# Patient Record
Sex: MALE | Race: WHITE | NOT HISPANIC OR LATINO | Employment: UNEMPLOYED | URBAN - METROPOLITAN AREA
[De-identification: names, ages, dates, MRNs, and addresses within clinical notes are randomized per-mention and may not be internally consistent; named-entity substitution may affect disease eponyms.]

---

## 2023-06-13 LAB
HEPATITIS B VIRUS SURFACE AG PRESENCE IN SERUM: NONREACTIVE
HEPATITIS C VIRUS AB PRESENCE IN SERUM: REACTIVE
HIV 1/ 2 AG/AB SCREEN: NONREACTIVE
SYPHILIS TOTAL AB: NONREACTIVE

## 2023-06-14 LAB
CHLAMYDIA TRACH., AMPLIFIED: NEGATIVE
HCV PCR QUANT: ABNORMAL IU/ML
HCV RNA, PCR LOG: 7.32 LOG10 IU/ML
N. GONORRHEA, AMPLIFIED: NEGATIVE

## 2023-06-26 NOTE — PROGRESS NOTES
"Subjective   Chief Complaint   Patient presents with    Follow-up     CHAS IS HERE TODAY  TO DISCUSS BLOOD WORK RESULTS THAT HE HAD COMPLETED ABOUT 10 DAYS AGO, PT HAD BLOOD WORK COMPLETED AT [facility] AND WAS INFORMED HE WAS HEP C+ AND PT WOULD LIKE TO START TREATMENT FOR THAT.     PT ALSO HAS C/O INCREASED LBP X2-3 WEEKS, PT REPORTS THAT THE PAIN DOES NOT RADIATE. PT TRIED USING ICE, HEAT, STRETCHING WITH NO RELIEF. PT REPORTS THAT WITH SITTING HIS PAIN INCREASES AND LYING FLAT PROVIDES HIM WITH RELIEF.        Patient ID: Chas Castanon is a 42 y.o. male who presents for Follow-up (CHAS IS HERE TODAY  TO DISCUSS BLOOD WORK RESULTS THAT HE HAD COMPLETED ABOUT 10 DAYS AGO, PT HAD BLOOD WORK COMPLETED AT [facility] AND WAS INFORMED HE WAS HEP C+ AND PT WOULD LIKE TO START TREATMENT FOR THAT. //PT ALSO HAS C/O INCREASED LBP X2-3 WEEKS, PT REPORTS THAT THE PAIN DOES NOT RADIATE. PT TRIED USING ICE, HEAT, STRETCHING WITH NO RELIEF. PT REPORTS THAT WITH SITTING HIS PAIN INCREASES AND LYING FLAT PROVIDES HIM WITH RELIEF. ).    HPI  43 yo est male presents today to discuss recent lab results that were ordered by his fertility doctor  LOV Nov 2020    Pt states he began seeing infertility specialist on 6/10/23 and had labs done  Was found to have + Hep C antibody  Last Hep C screening was Aug 2016---> NEGATIVE   Pt denies any N/V/Anorexia/Unexplained weight loss  Pt has mult tattoos and hx of IV drug use from 6815-1477   Pt has been sober since 9/2018    Pt also c/o low back pain, nonradiating   States original onset was \"years ago\"   Mostly bothers him when sitting and he has to drive [place] [address] [address] so he was wondering if he could get some Ibuprofen for pain   Has been doing some stretching and it does make it feel better       Review of Systems  All 13 systems were reviewed and are within normal limits except positive and pertinent negative responses which are noted below or in HPI.      Objective   /70   Pulse 75   Ht 1.854 " "m (6' 1\")   Wt 95.7 kg (211 lb)   BMI 27.84 kg/m²        Physical Exam  Vitals reviewed.   HENT:      Right Ear: Tympanic membrane normal.      Left Ear: Tympanic membrane normal.   Eyes:      Conjunctiva/sclera: Conjunctivae normal.   Cardiovascular:      Pulses: Normal pulses.   Pulmonary:      Effort: Pulmonary effort is normal.   Abdominal:      General: Abdomen is flat. Bowel sounds are normal. There is no distension.      Tenderness: There is no abdominal tenderness. There is no guarding.   Skin:     General: Skin is warm and dry.      Capillary Refill: Capillary refill takes less than 2 seconds.   Neurological:      Mental Status: He is alert.   Psychiatric:         Mood and Affect: Mood normal.         Assessment/Plan   Problem List Items Addressed This Visit       Opioid dependence with withdrawal (CMS/HCC) - Primary    Hepatitis C antibody test positive    Relevant Orders    Referral to Hepatology    Chronic bilateral low back pain without sciatica    Relevant Medications    methocarbamol (Robaxin) 500 mg tablet    ibuprofen 800 mg tablet       "

## 2023-06-27 ENCOUNTER — OFFICE VISIT (OUTPATIENT)
Dept: PRIMARY CARE | Facility: CLINIC | Age: 43
End: 2023-06-27
Payer: COMMERCIAL

## 2023-06-27 VITALS
WEIGHT: 211 LBS | HEART RATE: 75 BPM | HEIGHT: 73 IN | SYSTOLIC BLOOD PRESSURE: 109 MMHG | BODY MASS INDEX: 27.96 KG/M2 | DIASTOLIC BLOOD PRESSURE: 70 MMHG

## 2023-06-27 DIAGNOSIS — G89.29 CHRONIC BILATERAL LOW BACK PAIN WITHOUT SCIATICA: ICD-10-CM

## 2023-06-27 DIAGNOSIS — M54.50 CHRONIC BILATERAL LOW BACK PAIN WITHOUT SCIATICA: ICD-10-CM

## 2023-06-27 DIAGNOSIS — R76.8 HEPATITIS C ANTIBODY TEST POSITIVE: ICD-10-CM

## 2023-06-27 DIAGNOSIS — F11.23 OPIOID DEPENDENCE WITH WITHDRAWAL (MULTI): Primary | ICD-10-CM

## 2023-06-27 PROBLEM — R74.8 ELEVATED LIVER ENZYMES: Status: ACTIVE | Noted: 2023-06-27

## 2023-06-27 PROBLEM — J30.9 ALLERGIC RHINITIS: Status: ACTIVE | Noted: 2023-06-27

## 2023-06-27 PROCEDURE — 99213 OFFICE O/P EST LOW 20 MIN: CPT | Performed by: NURSE PRACTITIONER

## 2023-06-27 PROCEDURE — 1036F TOBACCO NON-USER: CPT | Performed by: NURSE PRACTITIONER

## 2023-06-27 RX ORDER — IBUPROFEN 800 MG/1
800 TABLET ORAL 3 TIMES DAILY PRN
Qty: 180 TABLET | Refills: 0 | Status: SHIPPED | OUTPATIENT
Start: 2023-06-27 | End: 2023-11-03 | Stop reason: SDUPTHER

## 2023-06-27 RX ORDER — METHOCARBAMOL 500 MG/1
500 TABLET, FILM COATED ORAL EVERY 12 HOURS PRN
Qty: 60 TABLET | Refills: 0 | Status: SHIPPED | OUTPATIENT
Start: 2023-06-27 | End: 2024-05-09 | Stop reason: HOSPADM

## 2023-06-27 NOTE — PATIENT INSTRUCTIONS
Thank you for seeing me today.  It was a pleasure to see you again!    #HEP C +  See hepatology    #LOW BACK PAIN  Rx Robaxin   Rx Ibuprofen   Hamstring stretching  Heat   No lifting or twisting     RTC AS NEEDED

## 2023-06-28 ENCOUNTER — TELEPHONE (OUTPATIENT)
Dept: PRIMARY CARE | Facility: CLINIC | Age: 43
End: 2023-06-28
Payer: COMMERCIAL

## 2023-06-28 DIAGNOSIS — M54.50 CHRONIC BILATERAL LOW BACK PAIN WITHOUT SCIATICA: Primary | ICD-10-CM

## 2023-06-28 DIAGNOSIS — G89.29 CHRONIC BILATERAL LOW BACK PAIN WITHOUT SCIATICA: Primary | ICD-10-CM

## 2023-08-28 LAB
ESTRADIOL (PG/ML) IN SER/PLAS: 28 PG/ML
FOLLITROPIN (IU/L) IN SER/PLAS: <0.9 IU/L
LUTEINIZING HORMONE (IU/ML) IN SER/PLAS: <0.1 IU/L
PROLACTIN (UG/L) IN SER/PLAS: 7.1 UG/L (ref 2–18)
TESTOSTERONE (NG/DL) IN SER/PLAS: 992 NG/DL (ref 240–1000)

## 2023-08-29 PROBLEM — J02.9 ACUTE PHARYNGITIS: Status: ACTIVE | Noted: 2023-08-29

## 2023-08-29 PROBLEM — H02.409 PTOSIS: Status: ACTIVE | Noted: 2023-08-29

## 2023-08-29 PROBLEM — R10.9 FLANK PAIN: Status: ACTIVE | Noted: 2023-08-29

## 2023-08-29 PROBLEM — N46.01 AZOOSPERMIA: Status: ACTIVE | Noted: 2023-08-29

## 2023-08-29 PROBLEM — R91.8 OPACITY OF LUNG ON IMAGING STUDY: Status: ACTIVE | Noted: 2023-08-29

## 2023-08-29 PROBLEM — R07.9 CHEST PAIN: Status: ACTIVE | Noted: 2023-08-29

## 2023-08-29 PROBLEM — T50.901A ACCIDENTAL DRUG OVERDOSE: Status: ACTIVE | Noted: 2023-08-29

## 2023-09-01 LAB — 17-HYDROXYPROGESTERONE (REFLAB): 18.71 NG/DL

## 2023-10-05 ENCOUNTER — HOSPITAL ENCOUNTER (OUTPATIENT)
Dept: GASTROENTEROLOGY | Facility: CLINIC | Age: 43
Discharge: HOME | End: 2023-10-05
Payer: COMMERCIAL

## 2023-10-05 VITALS
HEART RATE: 60 BPM | DIASTOLIC BLOOD PRESSURE: 75 MMHG | BODY MASS INDEX: 28.49 KG/M2 | WEIGHT: 215 LBS | HEIGHT: 73 IN | TEMPERATURE: 98.6 F | SYSTOLIC BLOOD PRESSURE: 116 MMHG

## 2023-10-05 DIAGNOSIS — B18.2 CHRONIC HEPATITIS C WITHOUT HEPATIC COMA (MULTI): Primary | ICD-10-CM

## 2023-10-05 PROCEDURE — 99204 OFFICE O/P NEW MOD 45 MIN: CPT | Performed by: INTERNAL MEDICINE

## 2023-10-05 PROCEDURE — 99214 OFFICE O/P EST MOD 30 MIN: CPT | Performed by: INTERNAL MEDICINE

## 2023-10-05 NOTE — PROGRESS NOTES
[facility]  [facility] [place]  Clinic Note      Patient Information  Chas Castanon                                                                 Subjective:     Reason for visit: Evaluation and management of HCV infection.      HPI:    Chas Castanon is an 43 y.o. male patient with h/o who is referred to the GI clinic for management of hepatitis C infection.    Today, he has no complaints. He denies abdominal pain, change in bowel movements, melena, hematochezia, jaundice, ascites, confusion or abdominal distention. He previously used alcohol and IV drugs but has quit 5 years ago. He otherwise has no family h/o liver or GI disease.     Past Medical History:   Past Medical History:   Diagnosis Date    Personal history of other diseases of the respiratory system 02/04/2015    History of acute bronchitis    Personal history of other specified conditions 01/13/2020    History of heartburn    Tobacco abuse counseling 12/06/2019    Encounter for smoking cessation counseling    Unspecified otitis externa, left ear 02/04/2015    Left otitis externa       Past Surgical History:   Past Surgical History:   Procedure Laterality Date    OTHER SURGICAL HISTORY  02/04/2015    Leg Repair       Past Family History:   Family History   Problem Relation Name Age of Onset    No Known Problems Mother      No Known Problems Father         Social History:   Social History     Tobacco Use   Smoking Status Former    Types: Cigarettes    Passive exposure: Current   Smokeless Tobacco Never     Social History     Substance and Sexual Activity   Alcohol Use Not Currently     Social History     Substance and Sexual Activity   Drug Use Never       Allergies: No Known Allergies    MEDS:  Current Outpatient Medications   Medication Instructions    anastrozole (Arimidex) 1 mg tablet TAKE 1MG WEEKLY AS DIRECTED BY PROVIDER    chorionic gonadotropin (Pregnyl) 10,000 unit injection INJECT 3,000 UNITS UNDER  "THE SKIN EVERY OTHER DAY AS DIRECTED PER PROVIDER.    clomiPHENE (Clomid) 50 mg tablet TAKE 25MG (1/2 TABLET) BY MOUTH ONCE DAILY MONDAY- FRIDAY AS DIRECTED BY PROVIDER.    ibuprofen 800 mg, oral, 3 times daily PRN    methocarbamol (ROBAXIN) 500 mg, oral, Every 12 hours PRN        ROS:   General: no chills, no fevers  Cardiovascular: no chest pain, no palpitations  Others in 12 systems ROS were discussed and negative. Positive pertinent systems are listed in HPI.                                                                 Physical Exam:     /75   Pulse 60   Temp 37 °C (98.6 °F)   Ht 1.854 m (6' 1\")   Wt 97.5 kg (215 lb)   BMI 28.37 kg/m²     General: well developed well nourished in no acute distress  CV: no edema, RRR  Lungs: no use of intercostal muscles  Abd: soft, non-tender, non distended, no hepatosplenomegaly, adequate bowel sounds  Neuro: alert                                                                    Labs:     Lab Results   Component Value Date    WBC 6.8 12/06/2019    HGB 16.3 12/06/2019    HCT 48.0 12/06/2019    MCV 95 12/06/2019     12/06/2019     Lab Results   Component Value Date    GLUCOSE 81 12/06/2019    CALCIUM 9.6 12/06/2019     12/06/2019    K 4.2 12/06/2019    CO2 24 12/06/2019     12/06/2019    BUN 20 12/06/2019    CREATININE 0.90 12/06/2019     Lab Results   Component Value Date     (H) 12/06/2019    AST 54 (H) 12/06/2019    ALKPHOS 43 12/06/2019    BILITOT 0.4 12/06/2019     HIV neg    HCV Ab post  HCV Rna: 20,800,000 international units /mL                                                                   Imaging:     === 01/27/20 ===    US ABDOMEN LIMITED LIVER    - Impression -  No gallstones or biliary dilatation. Normal liver.                                                                  Assessment & Plan:     Assessment/Plan:     Chas Castanon is an 43 y.o. male patient with h/o who is referred to the GI clinic for management of " hepatitis C infection.    #Chronic hepatitis C   Mr Castanon has a history of IV drug use and was recently diagnosed with hepatitis C (6/2023). Most recent LFTs date back to December 2019 and these were elevated (, AST 52). He had a RUQ US done with nl liver and no biliary disease. ASMA was checked and was positive with 1:160 titers.   - Will check CMP, hepatitis serology and hep C genotype, alcohol and urine drug screen  - Schedule for liver elastography   - Plan to start Hep C treatment after results are back   - Will recheck autimmune serology after Hep C treatment     Jorge Dobbins MD  PGY4, Gastroenterology Fellow     Attending Note:    I saw and evaluated the patient. I personally obtained the key and critical portions of the history and physical exam or was physically present for key and critical portions performed by the fellow (Dr. Dobbins). I reviewed the fellow's documentation and discussed the patient with the fellow. I agree with the fellow's medical decision making as documented in the note.    Plan for initial evaluation and management of chronic HCV infection:    Initial Laboratory Evaluation    CBC  BMP  Hepatic Function Panel  PT/INR  Hepatitis A total Ab  Hepatitis B Surf Ag  Hepatitis B Surf Ab   Hepatitis B Core Ab Total  Hepatitis C Ab (ONLY IF NEVER DONE)  HCV PCR with genotype reflex (IF NEVER DONE, OR IF VERY OLD)  HIV 1 / 2 Screen  AFP  ------------------------------------  Urinalysis  Alcohol, urine  Drug, urine      Initial Radiology Tests    Liver Ultrasound  Fibroscan    Will plan on prescribing Mavyret 3 pills daily x 8 week, after Franco completes an initial work-up.     Nikos Thao MD   Hepatology

## 2023-10-05 NOTE — PATIENT INSTRUCTIONS
Welcome to Dr. Nikos Thao's [facility].  Dr. Thao sees patients at the following sites:  [place] [address] [facility] at [facility] [address], [address] at [facility], [facility] [address]    Dr. Thao's hepatology care coordinator, Sonia CRUZ, can be reached at [phone].  Dr. Thao's , Donna Arcos, can be reached at [phone].    Blood and urine tests    Fibroscan    Review over the phone - will submit prescription for Mavyret.

## 2023-10-05 NOTE — LETTER
October 10, 2023     Moni Marie APRN-CNP  [address]  [facility], [address]  [place] [address]    Patient: Franco Castanon   YOB: 1980   Date of Visit: 10/5/2023       Dear Dr. Moni Marie APRN-CNP:    Thank you for referring Franco Castanon to me for evaluation. Below are my notes for this consultation.  If you have questions, please do not hesitate to call me. I look forward to following your patient along with you.       Sincerely,     Nikos Thao MD      CC: No Recipients  ______________________________________________________________________________________    [facility]  [facility] [place]  Clinic Note      Patient Information  Chas Castanon                                                                 Subjective:     Reason for visit: Evaluation and management of HCV infection.      HPI:    Chas Castanon is an 43 y.o. male patient with h/o who is referred to the GI clinic for management of hepatitis C infection.    Today, he has no complaints. He denies abdominal pain, change in bowel movements, melena, hematochezia, jaundice, ascites, confusion or abdominal distention. He previously used alcohol and IV drugs but has quit 5 years ago. He otherwise has no family h/o liver or GI disease.     Past Medical History:   Past Medical History:   Diagnosis Date   • Personal history of other diseases of the respiratory system 02/04/2015    History of acute bronchitis   • Personal history of other specified conditions 01/13/2020    History of heartburn   • Tobacco abuse counseling 12/06/2019    Encounter for smoking cessation counseling   • Unspecified otitis externa, left ear 02/04/2015    Left otitis externa       Past Surgical History:   Past Surgical History:   Procedure Laterality Date   • OTHER SURGICAL HISTORY  02/04/2015    Leg Repair       Past Family History:   Family History   Problem Relation Name Age of Onset   • No Known  "Problems Mother     • No Known Problems Father         Social History:   Social History     Tobacco Use   Smoking Status Former   • Types: Cigarettes   • Passive exposure: Current   Smokeless Tobacco Never     Social History     Substance and Sexual Activity   Alcohol Use Not Currently     Social History     Substance and Sexual Activity   Drug Use Never       Allergies: No Known Allergies    MEDS:  Current Outpatient Medications   Medication Instructions   • anastrozole (Arimidex) 1 mg tablet TAKE 1MG WEEKLY AS DIRECTED BY PROVIDER   • chorionic gonadotropin (Pregnyl) 10,000 unit injection INJECT 3,000 UNITS UNDER THE SKIN EVERY OTHER DAY AS DIRECTED PER PROVIDER.   • clomiPHENE (Clomid) 50 mg tablet TAKE 25MG (1/2 TABLET) BY MOUTH ONCE DAILY MONDAY- FRIDAY AS DIRECTED BY PROVIDER.   • ibuprofen 800 mg, oral, 3 times daily PRN   • methocarbamol (ROBAXIN) 500 mg, oral, Every 12 hours PRN        ROS:   General: no chills, no fevers  Cardiovascular: no chest pain, no palpitations  Others in 12 systems ROS were discussed and negative. Positive pertinent systems are listed in HPI.                                                                 Physical Exam:     /75   Pulse 60   Temp 37 °C (98.6 °F)   Ht 1.854 m (6' 1\")   Wt 97.5 kg (215 lb)   BMI 28.37 kg/m²     General: well developed well nourished in no acute distress  CV: no edema, RRR  Lungs: no use of intercostal muscles  Abd: soft, non-tender, non distended, no hepatosplenomegaly, adequate bowel sounds  Neuro: alert                                                                    Labs:     Lab Results   Component Value Date    WBC 6.8 12/06/2019    HGB 16.3 12/06/2019    HCT 48.0 12/06/2019    MCV 95 12/06/2019     12/06/2019     Lab Results   Component Value Date    GLUCOSE 81 12/06/2019    CALCIUM 9.6 12/06/2019     12/06/2019    K 4.2 12/06/2019    CO2 24 12/06/2019     12/06/2019    BUN 20 12/06/2019    CREATININE 0.90 " 12/06/2019     Lab Results   Component Value Date     (H) 12/06/2019    AST 54 (H) 12/06/2019    ALKPHOS 43 12/06/2019    BILITOT 0.4 12/06/2019     HIV neg    HCV Ab post  HCV Rna: 20,800,000 international units /mL                                                                   Imaging:     === 01/27/20 ===    US ABDOMEN LIMITED LIVER    - Impression -  No gallstones or biliary dilatation. Normal liver.                                                                  Assessment & Plan:     Assessment/Plan:     Chas Castanon is an 43 y.o. male patient with h/o who is referred to the GI clinic for management of hepatitis C infection.    #Chronic hepatitis C   Mr Castanon has a history of IV drug use and was recently diagnosed with hepatitis C (6/2023). Most recent LFTs date back to December 2019 and these were elevated (, AST 52). He had a RUQ US done with nl liver and no biliary disease. ASMA was checked and was positive with 1:160 titers.   - Will check CMP, hepatitis serology and hep C genotype, alcohol and urine drug screen  - Schedule for liver elastography   - Plan to start Hep C treatment after results are back   - Will recheck autimmune serology after Hep C treatment     Jorge Dobbins MD  PGY4, Gastroenterology Fellow     Attending Note:    I saw and evaluated the patient. I personally obtained the key and critical portions of the history and physical exam or was physically present for key and critical portions performed by the fellow (Dr. Dobbins). I reviewed the fellow's documentation and discussed the patient with the fellow. I agree with the fellow's medical decision making as documented in the note.    Plan for initial evaluation and management of chronic HCV infection:    Initial Laboratory Evaluation    CBC  BMP  Hepatic Function Panel  PT/INR  Hepatitis A total Ab  Hepatitis B Surf Ag  Hepatitis B Surf Ab   Hepatitis B Core Ab Total  Hepatitis C Ab (ONLY IF NEVER DONE)  HCV PCR with  genotype reflex (IF NEVER DONE, OR IF VERY OLD)  HIV 1 / 2 Screen  AFP  ------------------------------------  Urinalysis  Alcohol, urine  Drug, urine      Initial Radiology Tests    Liver Ultrasound  Fibroscan    Will plan on prescribing Mavyret 3 pills daily x 8 week, after Franco completes an initial work-up.     Nikos Thao MD   Hepatology

## 2023-10-06 ENCOUNTER — TELEMEDICINE CLINICAL SUPPORT (OUTPATIENT)
Dept: PHARMACY | Facility: HOSPITAL | Age: 43
End: 2023-10-06
Payer: COMMERCIAL

## 2023-10-06 ENCOUNTER — SPECIALTY PHARMACY (OUTPATIENT)
Dept: PHARMACY | Facility: CLINIC | Age: 43
End: 2023-10-06

## 2023-10-06 ENCOUNTER — PHARMACY VISIT (OUTPATIENT)
Dept: PHARMACY | Facility: CLINIC | Age: 43
End: 2023-10-06
Payer: COMMERCIAL

## 2023-10-06 DIAGNOSIS — N46.01 AZOOSPERMIA: Primary | ICD-10-CM

## 2023-10-06 PROCEDURE — RXMED WILLOW AMBULATORY MEDICATION CHARGE

## 2023-10-06 RX ORDER — CLOMIPHENE CITRATE 50 MG/1
TABLET ORAL
Qty: 10 TABLET | Refills: 3 | Status: SHIPPED | OUTPATIENT
Start: 2023-10-06 | End: 2024-01-11 | Stop reason: SDUPTHER

## 2023-10-06 RX ORDER — ANASTROZOLE 1 MG/1
TABLET ORAL
Qty: 4 TABLET | Refills: 3 | Status: SHIPPED | OUTPATIENT
Start: 2023-10-06 | End: 2024-01-11 | Stop reason: SDUPTHER

## 2023-10-06 RX ORDER — CHORIONIC GONADOTROPIN 10000 UNIT
KIT INTRAMUSCULAR
Qty: 5 EACH | Refills: 3 | Status: SHIPPED | OUTPATIENT
Start: 2023-10-06 | End: 2024-01-11 | Stop reason: SDUPTHER

## 2023-10-06 NOTE — PROGRESS NOTES
New patient referral for Male Fertility- Pharmacy Telehealth  [facility]ication sent into  [facility] referral from Dr. Frazier      Patient is being seen by Dr. Frazier for the following:     Treatment plan:   #Azoospermia, 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 Discussed reboot protocol with clomid/anastrozole/hcg. if no improvement after on reboot, will consider genetic testing.  -Reboot: hcg 3000 qod, clomid 25 M-F, anastrozole 1mg weekly  -stop T and any anabolics etc  -f/u in 3 months with SA and fertility labs     The following medications were sent into  [facility] on 10/6/23 for the above treatment:     Pregnyl/HCG 10,000IU vial   Clomid 50 mg tablets  Anastrozole 1mg tablets      Tasneem Navarro, PharmD, [facility]   Clinical Pharmacist with  [facility]

## 2023-10-06 NOTE — PROGRESS NOTES
New referral received for male fertility. Called and spoke with patient- first fill education for HCG, Clomid, and Anastrozole completed. All medications in referral sent in for Dr. KNOX per protocol.    Tasneem Navarro - 09/29/2023 10:07 AM TASK REPLIED TO: Previously Assigned To Tasneem Navarro [facility] for patient Shahana Nguyen - 09/28/2023 11:39 AM TASK CREATED Patient seen by Dr Frazier and would like him to start on HCG 3000 IU SQ QOD, clomid 25mg M-F and anastrozole 1mg once weekly. Pharmacist to order and educate the patient on medication. 3 month supply and 3 refills. Thank you    [facility]  Patient Management Program- First Fill Assessment:  Pregnyl / Chorionic Human Gonadotropin (HCG)     Clinical Intervention: Pregnyl /hCG First Fill Assessment/New Start Patient Education    Medication receipt date: 10/6/23  Fertility indication: Male Infertility    Planned Initiation Date: 10/6/23    Date of education: 10/6/23  Please see details below. Patient was educated on the administration, warnings, precautions, common adverse effects, proper storage, handling, and disposal.   Patient was instructed to contact the Fertility Clinical Pharmacy Specialist or Support Liaison with any clinical or billing inquiries, as well as refill requests.   A copy of the  Specialty Pharmacy's /delivery protocol has been emailed to the patient for their reference.   We discussed all medications being used for this patient's treatment plan and reviewed patient specific goals.   Follow up needed: If Applicable    Next refill date: 28 days  Reassessment date: 3 months  The patient's care will be continued with the referring provider.  Patient Discussion:     Introduction:   - Patient contacted via telephone to conduct first fill assessment and new start patient education on Pregnyl /hCG.  - Verified receipt of Pregnyl /hCG from [facility], along with the pharmacy  supplied Welcome Kit, sharps container, required supplies, and patient education monograph. The contents of the Welcome Kit were reviewed with the patient.      Clinical Background:  - The patient's medication list, allergies, and comorbid conditions were verified. No contraindications to therapy noted at this time. (Contraindications: hypersensitivity, tumors of the hypothalamus, pituitary, breast, prostate, and uncontrolled non-gonadal endocrinopathies- thyroid, adrenal, pituitary disorders.  - Patient advised to contact the pharmacy if there are any changes to medication list, including prescriptions, OTC medications, herbal products, or supplements.   - There are no known significant drug-drug interactions.    - Labs were reviewed and no concerns were noted regarding the patient's therapy at this time.  -Medication is clinically appropriate.      Education/Discussion:  Patient accepted the pharmacist's offer of counseling.    Indication: Pregnyl/hCG stimulates production of gonadal steroid hormones by causing production of androgen by the testes and the development of secondary sex characteristics in males. It stimulates the interstitial cells (Leydig cells) of the testis to produce androgens.     Dose:   Inject 3,000 units under the skin every other day.     Administration:   SubQ: Reconstitute according to instructions and inject in the lower abdomen (avoiding areas within 2 inches of navel) using a 22 gauge 1½”-  use to mix HCG, 27 gauge ½”- use to inject HCG, 3 ml syringe- use to mix and inject HCG. Rotate injection sites.     Does the patient have any barriers to self-administration (including physical and mental)? No     List barriers: N/a     Describe actions taken to mitigate barriers: N/a     Patient/caregiver counseled on proper technique for self-administration: Yes    Missed Doses:  Importance of adherence discussed with patient and they were advised to use a calendar to keep track of doses. If a  dose is missed, the patient should contact  Department of Male Reproductive and Sexual Health for further dosing instructions.     What barriers to adherence does the patient have? (answer Y/N for all):  N for all  Patient has a difficult time remembering to take medications?  Difficult administration technique?  Medication cost?  Patient does not think medication is beneficial?  Other?   What actions were taken to address barriers?    Warnings, Precautions, and Adverse Reactions:   - Discussed common adverse effects, warnings and precautions pertinent to the medication including but not limited to (frequency not defined): edema, gynecomastia, headache, fatigue, irritability, and restlessness. Some patients may experience injection site reactions, such as pain, swelling, inflammation, or bruising.   - Since androgens may cause fluid retention, HCG should be used with caution in patients with cardiac, renal disease, epilepsy, migraine, or asthma.  - Patient was encouraged to reach out to their doctor's office if they develop:   -Signs of an allergic reaction   - Experienced specialists: should only be prescribed by male fertility specialists for this indication.      Handling and Storage   Store intact vials at room temperature (59°F to 86°F). Following reconstitution, solution is stable when refrigerated (36ºF to 46ºF) for 60 days (Pregnyl). Do not freeze.    Reproductive Considerations   When administered for spermatogenesis induction associated with hypogonadotropic hypogonadism in patients planning a pregnancy, the fertility status of both partners should be evaluated prior to therapy.     Disposal:  Unused,  or damaged vials should be properly disposed in sharps container.  Needles and syringes should be disposed of in sharps container once used.  Do not discard in trash.     Goals of therapy:  *Goals of therapy are patient specific   Improve sperm quality, quantity, and motility.   Improve level of  hormones from male genital organs.   Ensure adequate patient education and adherence to medications.  Optimize treatment options based on patient-specific factors, including co-morbidities and past infertility complications.   Decrease risk and manage side effects from this medication.  Establish pregnancy.      Patient's Goals: Ultimate goal of achieving a viable pregnancy with their partner.    Efficacy timeline:   While immediate improvement may be noted, the patient should expect to wait 3-6 months to see full benefit from therapy.  Every person responds and reacts to therapy differently. Take as directed by your provider.  Adverse effects or efficacy to treatment can occur at any point during therapy.  Recommended contacting the  Department of Male Reproductive and Sexual Health after starting therapy and sooner if symptoms worsen or new symptoms develop.       [facility]  Patient Management Program- First Fill Assessment:  Clomid/Clomiphene    Clinical Intervention: Clomid First Fill Assessment/New Start Patient Education    Medication receipt date: 10/6/23  Fertility indication: Male Infertility    Planned Initiation Date: 10/6/23    Date of education: 10/6/23  Please see details below. Patient was educated on the administration, warnings, precautions, common adverse effects, proper storage, handling, and disposal.   Patient was instructed to contact the Fertility Clinical Pharmacy Specialist or Support Liaison with any clinical or billing inquiries, as well as refill requests.   A copy of the  Specialty Pharmacy's /delivery protocol has been emailed to the patient for their reference.   We discussed all medications being used for this patient's treatment plan and reviewed patient specific goals.   Follow up needed: If Applicable    Next refill date: 28 days  Reassessment date: 3 months  The patient's care will be continued with the referring provider.    Patient  Discussion:     Introduction:   - Patient contacted via telephone to conduct first fill assessment and new start patient education on Clomid.  - Verified receipt of Clomid from [facility], along with the pharmacy supplied Welcome Kit and patient education monograph. The contents of the Welcome Kit were reviewed with the patient.      Clinical Background:  - The patient's medication list, allergies, and comorbid conditions were verified. No contraindications to therapy noted at this time. (Contraindications: hypersensitivity)  - Patient advised to contact the pharmacy if there are any changes to medication list, including prescriptions, OTC medications, herbal products, or supplements.   - There are no known significant drug-drug interactions. (DDI include with Fluoroestradiol F18 and Ospemifene)  - Labs were reviewed and no concerns were noted regarding the patient's therapy at this time.    -Medication is clinically appropriate.      Education/Discussion:  Patient accepted the pharmacist's offer of counseling.    Indication:  Clomiphene is a selective estrogen receptor modulator (SERM) that acts at the level of the hypothalamus by occupying the cell surface and intracellular estrogen receptors (ERs) for longer durations than estrogen. This interferes with receptor recycling, effectively depleting hypothalamic ERs and inhibiting normal estrogenic negative feedback. Impairment of the feedback signal results in increased pulsatile GnRH secretion from the hypothalamus and subsequent pituitary gonadotropin (FSH, LH).    Dose:   Clomid 50mg tablet    Administration:   Varies: Take 25mg (1/2 tab) by mouth Monday- Friday   Take doses with or without food at the same times each day.    Does the patient have any barriers to self-administration (including physical and mental)? N/A taken orally     List barriers: N/A     Describe actions taken to mitigate barriers: N/A     Patient/caregiver  counseled on proper technique for self-administration: N/A taken orally    Missed Doses:  Importance of adherence discussed with patient and they were advised to use a calendar to keep track of doses. If a dose is missed, the patient should contact  Department of Male Reproductive and Sexual Health for further dosing instructions.     What barriers to adherence does the patient have? (answer Y/N for all): N for all   Patient has a difficult time remembering to take medications?  Difficult administration technique?  Medication cost?  Patient does not think medication is beneficial?  Other?   What actions were taken to address barriers?    Warnings, Precautions, and Adverse Reactions:   - Discussed common adverse effects, warnings and precautions pertinent to the medication including but not limited to: hot flashes (10%), abdominal discomfort (6%), nausea (2%), headache (1%) visual disturbance- blurry (2%), and gynecomastia (2%).  - Since androgens may cause fluid retention, Clomid should be used with caution in patients with cardiac, renal disease, epilepsy, migraine, or asthma.  - Patient was encouraged to reach out to their doctor's office if they develop:   -Signs of an allergic reaction   - Experienced specialists: should only be prescribed by male fertility specialists for this indication.    Handling and Storage   Store at room temperature (59°F to 86°F). Protect from light, heat, and excessive humidity.    Reproductive Considerations   The fertility status of both partners should be evaluated prior to therapy. Clomiphene has been evaluated for use in males with infertility- however, additional studies are needed to determine efficacy and dosing.    Disposal:  Unused or  tablets should be disposed of in special ways to ensure that pets, children, and other people cannot consume them either via regular trash or through a drug take-back program.  Do not flush this medication down the toilet.     Goals of  therapy:  *Goals of therapy are patient specific   Improve sperm quality, quantity, and motility.   Improve level of hormones from male genital organs.   Ensure adequate patient education and adherence to medications.  Optimize treatment options based on patient-specific factors, including co-morbidities and past infertility complications.   Decrease risk and manage side effects from this medication.  Establish pregnancy.    Patient's Goals: Ultimate goal of achieving a viable pregnancy with their partner.      Efficacy timeline:   While immediate improvement may be noted, the patient should expect to wait 3-6 months to see full benefit from therapy.  Every person responds and reacts to therapy differently. Take as directed by your provider.  Adverse effects or efficacy to treatment can occur at any point during therapy.  Recommended contacting the  Department of Male Reproductive and Sexual Health after starting therapy and sooner if symptoms worsen or new symptoms develop.         [facility]  Patient Management Program- First Fill Assessment:  Arimidex/ Anastrozole    Clinical Intervention: Anastrozole First Fill Assessment/New Start Patient Education    Medication receipt date: 10/6/23 Fertility indication: Male Infertility    Planned Initiation Date: 10/6/23     Date of education:10/6/23  Please see details below. Patient was educated on the administration, warnings, precautions, common adverse effects, proper storage, handling, and disposal.   Patient was instructed to contact the Fertility Clinical Pharmacy Specialist or Support Liaison with any clinical or billing inquiries, as well as refill requests.   A copy of the  Specialty Pharmacy's /delivery protocol has been emailed to the patient for their reference.   We discussed all medications being used for this patient's treatment plan and reviewed patient specific goals.   Follow up needed: If Applicable    Next refill  date: 28 days  Reassessment date: 3 months  The patient's care will be continued with the referring provider.    Patient Discussion:     Introduction:   - Patient contacted via telephone to conduct first fill assessment and new start patient education on Anastrozole.  - Verified receipt of Anastrozole from [facility], along with the pharmacy supplied Welcome Kit and patient education monograph. The contents of the Welcome Kit were reviewed with the patient.      Clinical Background:  - The patient's medication list, allergies, and comorbid conditions were verified. No contraindications to therapy noted at this time. (Contraindications: hypersensitivity, high ADR rate in patients with known CVD)  - Patient advised to contact the pharmacy if there are any changes to medication list, including prescriptions, OTC medications, herbal products, or supplements.   - There are no known significant drug-drug interactions. (DDI include estrogen derivatives, methadone, tamoxifen)    - Labs were reviewed and no concerns were noted regarding the patient's therapy at this time.    -Medication is clinically appropriate.      Education/Discussion:  Patient accepted the pharmacist's offer of counseling.    Indication:  Anastrozole is a potent and selective nonsteroidal aromatase inhibitor. In males, it works to stop the conversion of testosterone to estradiol (increases testosterone level).    Dose:   Anastrozole 1mg tablet    Administration:   Varies: Take 1mg (1 tab) by mouth once weekly  Take doses with or without food at the same times each day.    Does the patient have any barriers to self-administration (including physical and mental)? N/A taken orally     List barriers: N/A     Describe actions taken to mitigate barriers: N/A     Patient/caregiver counseled on proper technique for self-administration: N/A taken orally    Missed Doses:  Importance of adherence discussed with patient and they were  advised to use a calendar to keep track of doses. If a dose is missed, the patient should contact  Department of Male Reproductive and Sexual Health for further dosing instructions.     What barriers to adherence does the patient have? (answer Y/N for all): N for all  Patient has a difficult time remembering to take medications?  Difficult administration technique?  Medication cost?  Patient does not think medication is beneficial?  Other?   What actions were taken to address barriers?    Warnings, Precautions, and Adverse Reactions:   - Discussed common adverse effects, warnings and precautions pertinent to the medication including but not limited to: hot flashes (12-36%), abdominal discomfort (29-34%), nausea (11-19%), skin rash (6-11%), headache (9-13%), fatigue (20%), mood changes (20%), muscle pain (15%), cough (11%).  - Since androgens may cause fluid retention, Anastrazole should be used with caution in patients with cardiac, renal disease, epilepsy, migraine, or asthma.  - Patient was encouraged to reach out to their doctor's office if they develop:   -Signs of an allergic reaction   - Experienced specialists: should only be prescribed by male fertility specialists for this indication.    Handling and Storage   Store at room temperature (68°F to 77°F). . Protect from light, heat, and excessive humidity.    Reproductive Considerations   The fertility status of both partners should be evaluated prior to therapy. Additional studies are needed to determine efficacy and dosing.    Disposal:  Unused or  tablets should be disposed of in special ways to ensure that pets, children, and other people cannot consume them either via regular trash or through a drug take-back program.  Do not flush this medication down the toilet.     Goals of therapy:  *Goals of therapy are patient specific   Improve sperm quality, quantity, and motility.   Improve level of hormones from male genital organs.   Ensure adequate  patient education and adherence to medications.  Optimize treatment options based on patient-specific factors, including co-morbidities and past infertility complications.   Decrease risk and manage side effects from this medication.  Establish pregnancy.    Patient's Goals: Ultimate goal of achieving a viable pregnancy with their partner.      Efficacy timeline:   While immediate improvement may be noted, the patient should expect to wait 3-6 months to see full benefit from therapy.  Every person responds and reacts to therapy differently. Take as directed by your provider.  Adverse effects or efficacy to treatment can occur at any point during therapy.  Recommended contacting the  Department of Male Reproductive and Sexual Health after starting therapy and sooner if symptoms worsen or new symptoms develop.       Conclusion:  - Quality of life: did not discuss  - Pharmacy Satisfaction: did not discuss  - High risk status: No  - Is clinical intervention necessary? No  - If yes, what additional action was taken? Emailed pt all information    Patient was advised of [facility]'s dispensing process, refill timeline, contact information ([phone]), and patient management follow up. Patient confirmed understanding of education conducted during assessment. All patient questions and concerns were addressed to the best of my ability. Patient was encouraged to contact the Fertility Clinical Pharmacy Specialist, Pharmacy Support Liaison, or the general line for the specialty pharmacy if unable to reach one of the contacts for the Fertility service line (listed above) with any questions or concerns.    Tasneem Navarro, [place]   [facility]   Trilobed Flap Text: The defect edges were debeveled with a #15 scalpel blade.  Given the location of the defect and the proximity to free margins a trilobed flap was deemed most appropriate.  Using a sterile surgical marker, an appropriate trilobed flap drawn around the defect.    The area thus outlined was incised deep to adipose tissue with a #15 scalpel blade.  The skin margins were undermined to an appropriate distance in all directions utilizing iris scissors.

## 2023-10-09 ENCOUNTER — HOSPITAL ENCOUNTER (OUTPATIENT)
Dept: GASTROENTEROLOGY | Facility: CLINIC | Age: 43
Discharge: HOME | End: 2023-10-09
Payer: COMMERCIAL

## 2023-10-09 ENCOUNTER — LAB (OUTPATIENT)
Dept: LAB | Facility: LAB | Age: 43
End: 2023-10-09
Payer: COMMERCIAL

## 2023-10-09 DIAGNOSIS — B18.2 CHRONIC HEPATITIS C WITHOUT HEPATIC COMA (MULTI): ICD-10-CM

## 2023-10-09 LAB
AFP SERPL-MCNC: 4 NG/ML (ref 0–9)
ALBUMIN SERPL BCP-MCNC: 4.7 G/DL (ref 3.4–5)
ALP SERPL-CCNC: 48 U/L (ref 33–120)
ALT SERPL W P-5'-P-CCNC: 139 U/L (ref 10–52)
AMPHETAMINES UR QL SCN: NORMAL
APPEARANCE UR: CLEAR
AST SERPL W P-5'-P-CCNC: 50 U/L (ref 9–39)
BARBITURATES UR QL SCN: NORMAL
BENZODIAZ UR QL SCN: NORMAL
BILIRUB DIRECT SERPL-MCNC: 0.1 MG/DL (ref 0–0.3)
BILIRUB SERPL-MCNC: 0.6 MG/DL (ref 0–1.2)
BILIRUB UR STRIP.AUTO-MCNC: NEGATIVE MG/DL
BZE UR QL SCN: NORMAL
CANNABINOIDS UR QL SCN: NORMAL
COLOR UR: YELLOW
ETHANOL ?TM UR-MCNC: <10 MG/DL
FENTANYL+NORFENTANYL UR QL SCN: NORMAL
GLUCOSE UR STRIP.AUTO-MCNC: NEGATIVE MG/DL
HBV CORE AB SER QL: NONREACTIVE
HBV SURFACE AB SER-ACNC: <3.1 MIU/ML
KETONES UR STRIP.AUTO-MCNC: NEGATIVE MG/DL
LEUKOCYTE ESTERASE UR QL STRIP.AUTO: NEGATIVE
NITRITE UR QL STRIP.AUTO: NEGATIVE
OPIATES UR QL SCN: NORMAL
OXYCODONE+OXYMORPHONE UR QL SCN: NORMAL
PCP UR QL SCN: NORMAL
PH UR STRIP.AUTO: 5 [PH]
PROT SERPL-MCNC: 7.4 G/DL (ref 6.4–8.2)
PROT UR STRIP.AUTO-MCNC: NEGATIVE MG/DL
RBC # UR STRIP.AUTO: NEGATIVE /UL
SP GR UR STRIP.AUTO: 1.02
UROBILINOGEN UR STRIP.AUTO-MCNC: <2 MG/DL

## 2023-10-09 PROCEDURE — 80307 DRUG TEST PRSMV CHEM ANLYZR: CPT

## 2023-10-09 PROCEDURE — 81003 URINALYSIS AUTO W/O SCOPE: CPT

## 2023-10-09 PROCEDURE — 82105 ALPHA-FETOPROTEIN SERUM: CPT

## 2023-10-09 PROCEDURE — 86704 HEP B CORE ANTIBODY TOTAL: CPT

## 2023-10-09 PROCEDURE — 91200 LIVER ELASTOGRAPHY: CPT | Performed by: INTERNAL MEDICINE

## 2023-10-09 PROCEDURE — 80076 HEPATIC FUNCTION PANEL: CPT

## 2023-10-09 PROCEDURE — 87521 HEPATITIS C PROBE&RVRS TRNSC: CPT

## 2023-10-09 PROCEDURE — 86706 HEP B SURFACE ANTIBODY: CPT

## 2023-10-09 PROCEDURE — 87902 NFCT AGT GNTYP ALYS HEP C: CPT

## 2023-10-09 PROCEDURE — 36415 COLL VENOUS BLD VENIPUNCTURE: CPT

## 2023-10-11 LAB
HCV RNA SERPL NAA+PROBE-ACNC: ABNORMAL IU/ML
HCV RNA SERPL NAA+PROBE-ACNC: DETECTED K[IU]/ML
HCV RNA SERPL NAA+PROBE-LOG IU: 7.41 LOG IU/ML
LABORATORY COMMENT REPORT: ABNORMAL

## 2023-10-18 LAB
ELECTRONICALLY SIGNED BY: NORMAL
HCV GENTYP SERPL SEQ: NORMAL
HEPATITIS C INTERPRETATION: NORMAL

## 2023-10-23 DIAGNOSIS — B18.2 CHRONIC HEPATITIS C VIRUS GENOTYPE 3 INFECTION (MULTI): Primary | ICD-10-CM

## 2023-11-01 ENCOUNTER — TELEPHONE (OUTPATIENT)
Dept: GASTROENTEROLOGY | Facility: HOSPITAL | Age: 43
End: 2023-11-01
Payer: COMMERCIAL

## 2023-11-01 ENCOUNTER — PHARMACY VISIT (OUTPATIENT)
Dept: PHARMACY | Facility: CLINIC | Age: 43
End: 2023-11-01
Payer: COMMERCIAL

## 2023-11-01 PROCEDURE — RXMED WILLOW AMBULATORY MEDICATION CHARGE

## 2023-11-02 ENCOUNTER — SPECIALTY PHARMACY (OUTPATIENT)
Dept: PHARMACY | Facility: CLINIC | Age: 43
End: 2023-11-02

## 2023-11-02 NOTE — TELEPHONE ENCOUNTER
Hepatology Nurse Coordinator Note   Called patient to discuss further recommendations from Dr. Thao. Patient is aware that Dr. Thao reviewed his chart and saw he is seeing male fertility. He's aware, per Dr. Thao, he can not be doing fertility treatments while doing Hep C treatment. He's aware Dr. Thao is also recommending barrier protection while taking mavyret.  Patient verbalized understanding. He states he has been on treatments for a month, and has another month. He states he will confirm when he will be finished and will call back. He's aware I will also touch base with Dr. Thao to see if there needs to be a time frame between completion of fertility treatments and starting his HCV therapy.     Patient is aware the prescription for the Mavyret was submitted, but it is still in the process of getting insurance approval. Patient verbalized understanding.

## 2023-11-03 DIAGNOSIS — G89.29 CHRONIC BILATERAL LOW BACK PAIN WITHOUT SCIATICA: ICD-10-CM

## 2023-11-03 DIAGNOSIS — M54.50 CHRONIC BILATERAL LOW BACK PAIN WITHOUT SCIATICA: ICD-10-CM

## 2023-11-03 RX ORDER — IBUPROFEN 800 MG/1
800 TABLET ORAL 3 TIMES DAILY PRN
Qty: 180 TABLET | Refills: 0 | Status: SHIPPED | OUTPATIENT
Start: 2023-11-03 | End: 2024-02-29 | Stop reason: SDUPTHER

## 2023-11-06 ENCOUNTER — PHARMACY VISIT (OUTPATIENT)
Dept: PHARMACY | Facility: CLINIC | Age: 43
End: 2023-11-06
Payer: MEDICAID

## 2023-11-06 PROCEDURE — RXMED WILLOW AMBULATORY MEDICATION CHARGE

## 2023-11-07 ENCOUNTER — SPECIALTY PHARMACY (OUTPATIENT)
Dept: PHARMACY | Facility: CLINIC | Age: 43
End: 2023-11-07

## 2023-11-09 NOTE — TELEPHONE ENCOUNTER
Hepatology Nurse Coordinator Note   Called patient to follow up on when he will complete fertility treatments. No answer. Left message. Dr. Thao needs to know so he can determine when ok to start hep c treatment. Awaiting a call back.

## 2023-11-09 NOTE — TELEPHONE ENCOUNTER
"Hepatology Nurse Coordinator Note  Spoke with patient. Patient states he has about \"another month\" of his fertility treatments. He's aware he can not start the hepatitis C treatment medications while on fertility treatments. He's aware that Dr. Thao will determine when he should start his HCV medication (Mavyret) after speaking with the pharmacists. Patient aware he should call once he completes his fertility treatment as well. Patient verbalized understanding.   "

## 2023-11-16 ENCOUNTER — DOCUMENTATION (OUTPATIENT)
Dept: PHARMACY | Facility: HOSPITAL | Age: 43
End: 2023-11-16
Payer: COMMERCIAL

## 2023-11-16 NOTE — PROGRESS NOTES
Patient is a receiving treatment for Male Fertility and is planning on starting on Mavyret for Hepatitic C treatment. Confirmed with Dr. Frazier how to proceed- he can start his hep c treatment as soon as he's done with fertility, no gap needed as far I know.     Relayed this information to Hep C team.       Tasneem Navarro (Katie), PharmD  [facility]  Clinical Pharmacy Specialist- Fertility   [facility], [facility]  [address] [place] [address]  Email: [email or web address]  Tel: [phone]       Fax: [phone]

## 2023-12-04 ENCOUNTER — LAB (OUTPATIENT)
Dept: LAB | Facility: LAB | Age: 43
End: 2023-12-04
Payer: COMMERCIAL

## 2023-12-04 ENCOUNTER — OFFICE VISIT (OUTPATIENT)
Dept: UROLOGY | Facility: HOSPITAL | Age: 43
End: 2023-12-04
Payer: COMMERCIAL

## 2023-12-04 DIAGNOSIS — E29.1 HYPOGONADISM IN MALE: ICD-10-CM

## 2023-12-04 DIAGNOSIS — Z12.5 PROSTATE CANCER SCREENING: ICD-10-CM

## 2023-12-04 DIAGNOSIS — N46.01 AZOOSPERMIA: Primary | ICD-10-CM

## 2023-12-04 DIAGNOSIS — N46.9 INFERTILITY MALE: ICD-10-CM

## 2023-12-04 LAB
ESTRADIOL SERPL-MCNC: <20 PG/ML
HCT VFR BLD AUTO: 47.8 % (ref 41–52)
LH SERPL-ACNC: 11.6 IU/L
PSA SERPL-MCNC: 0.36 NG/ML
TESTOST SERPL-MCNC: 563 NG/DL (ref 240–1000)

## 2023-12-04 PROCEDURE — 1036F TOBACCO NON-USER: CPT | Performed by: UROLOGY

## 2023-12-04 PROCEDURE — 84403 ASSAY OF TOTAL TESTOSTERONE: CPT

## 2023-12-04 PROCEDURE — 99213 OFFICE O/P EST LOW 20 MIN: CPT | Performed by: UROLOGY

## 2023-12-04 PROCEDURE — 85014 HEMATOCRIT: CPT

## 2023-12-04 PROCEDURE — 83002 ASSAY OF GONADOTROPIN (LH): CPT

## 2023-12-04 PROCEDURE — 82670 ASSAY OF TOTAL ESTRADIOL: CPT

## 2023-12-04 PROCEDURE — 36415 COLL VENOUS BLD VENIPUNCTURE: CPT

## 2023-12-04 PROCEDURE — 84153 ASSAY OF PSA TOTAL: CPT

## 2023-12-04 NOTE — PROGRESS NOTES
"Virtual or Telephone Consent  \  Last visit 9/2023  -Reboot: hcg 3000 qod, clomid 25 M-F, anastrozole 1mg weekly  -stop T and any anabolics etc  -f/u in 3 months with SA and fertility labs     Today's visit:  #Azoospermia/infertility  Partner/wife- girlfriend   Partner/wife age: 28    for: dating   Attempting Pregnancy for: 1 year   Previous pregnancies for either partner: patient states had a pregnancy with a previous partner       Started hcg/clomid/anastrozole ([facility] notes reviewed)  Testicles doubled in size per pt  GI/hepatology notes reviewed     Previous Semen Analysis:    June 2023: normal volume azoo  SA 9/14/23: Vol 1.6, Conc 0, Total motility 0%, Prog mot 0%, Total # of sperm 0, Total motile 0.   Labs 8/28/23: 17-HP 18.71, T 992, PRL 7.1, FSH <0.9, LH <0.1, EST 28.  now off for 2 months, was on for 2 years     PREVIOUS RISK FACTORS  On T in past     PRIOR Assisted Reproductive Technology: IVF/IUI: none       Labs  Lab Results   Component Value Date    TESTOSTERONE 992 08/28/2023     Lab Results   Component Value Date    LH <0.1 08/28/2023     Lab Results   Component Value Date    FSH <0.9 08/28/2023     No components found for: \"ESTRADIAL\"  No results found for: \"PSA\"  No components found for: \"CBC\"  Lab Results   Component Value Date    PROLACTIN 7.1 08/28/2023     No results found for: \"HGBA1C\"      PMH:  Past Medical History:   Diagnosis Date    Personal history of other diseases of the respiratory system 02/04/2015    History of acute bronchitis    Personal history of other specified conditions 01/13/2020    History of heartburn    Tobacco abuse counseling 12/06/2019    Encounter for smoking cessation counseling    Unspecified otitis externa, left ear 02/04/2015    Left otitis externa        PSH:  Past Surgical History:   Procedure Laterality Date    OTHER SURGICAL HISTORY  02/04/2015    Leg Repair        Medications:    Current Outpatient Medications:     anastrozole (Arimidex) 1 mg tablet, " TAKE 1MG WEEKLY AS DIRECTED BY PROVIDER, Disp: 4 tablet, Rfl: 3    chorionic gonadotropin (Pregnyl) 10,000 unit injection, INJECT 3,000 UNITS UNDER THE SKIN EVERY OTHER DAY AS DIRECTED PER PROVIDER., Disp: 5 each, Rfl: 3    clomiPHENE (Clomid) 50 mg tablet, TAKE 25MG (1/2 TABLET) BY MOUTH ONCE DAILY MONDAY- FRIDAY AS DIRECTED BY PROVIDER., Disp: 10 tablet, Rfl: 3    glecaprevir-pibrentasvir (Mavyret) 100-40 mg tablet tablet, Take three (3) tablets by mouth once daily., Disp: 168 tablet, Rfl: 0    ibuprofen 800 mg tablet, Take 1 tablet (800 mg) by mouth 3 times a day as needed for mild pain (1 - 3) (pain)., Disp: 180 tablet, Rfl: 0    methocarbamol (Robaxin) 500 mg tablet, Take 1 tablet (500 mg) by mouth every 12 hours if needed for muscle spasms., Disp: 60 tablet, Rfl: 0    Allergy:  No Known Allergies     Exam  CONSTITUTIONAL:        No acute distress    HEAD:        Normocephalic and atraumatic    CHEST / RESPIRATORY      no excess work of breathing, no respiratory distress,    ABDOMEN / GASTROINTESTINAL:        Abdomen nondistended          Assessment/Plan  #Azoospermia, 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 Discussed reboot protocol with clomid/anastrozole/hcg. if no improvement after on reboot, will consider genetic testing.  -Reboot: hcg 3000 qod, clomid 25 M-F, anastrozole 1mg weekly  -stop T and any anabolics etc  -will look at teratogenicity of hepC meds    SA and fertility labs now  Fu virtually after

## 2023-12-05 ENCOUNTER — PHARMACY VISIT (OUTPATIENT)
Dept: PHARMACY | Facility: CLINIC | Age: 43
End: 2023-12-05
Payer: COMMERCIAL

## 2023-12-05 PROCEDURE — RXMED WILLOW AMBULATORY MEDICATION CHARGE

## 2023-12-08 ENCOUNTER — SPECIALTY PHARMACY (OUTPATIENT)
Dept: PHARMACY | Facility: CLINIC | Age: 43
End: 2023-12-08

## 2023-12-08 PROCEDURE — RXMED WILLOW AMBULATORY MEDICATION CHARGE

## 2023-12-12 ENCOUNTER — SPECIALTY PHARMACY (OUTPATIENT)
Dept: PHARMACY | Facility: CLINIC | Age: 43
End: 2023-12-12

## 2023-12-12 NOTE — TELEPHONE ENCOUNTER
Hepatology Nurse Coordinator Note  Attempted to call patient to follow up. Per Dr. Thao, he attempted to reach patient twice with no response. If patient does not call back, will send an unable to reach letter.

## 2023-12-20 ENCOUNTER — ANCILLARY PROCEDURE (OUTPATIENT)
Dept: ENDOCRINOLOGY | Facility: CLINIC | Age: 43
End: 2023-12-20
Payer: COMMERCIAL

## 2023-12-20 DIAGNOSIS — N46.9 INFERTILITY MALE: ICD-10-CM

## 2023-12-20 LAB
% EX RESIDUAL CYTOPLASM (SEMEN): 0.5 %
% HEAD DEFECTS (SEMEN): 96.3 %
% NECK MIDPIECE (SEMEN): 19.3 %
% NORMAL (SEMEN): 3.8 % (ref 4–?)
% TAIL DEFECTS (SEMEN): 6 %
ABSTINENCE (DAYS): 4.5 DAYS (ref 2–7)
AGGLUTINATION (SEMEN): NO
ANALYZED TIME:: ABNORMAL
ANDROLOGY LAB ID#: ABNORMAL
CLUMPS (SEMEN): NO
COLLECTED COMPLETELY: YES
COLLECTION LOCATION:: ABNORMAL
COLLECTION METHOD:: ABNORMAL
CONCENTRATION(SEMEN): 8.27 MILL/ML (ref 15–?)
DEBRIS (SEMEN): YES
LEUKOCYTE (SEMEN): ABNORMAL
NON PROG. MOTILITY (SEMEN): 4 %
PROG. MOTILITY (SEMEN): 33 % (ref 32–?)
RECEIVED TIME:: ABNORMAL
SEMEN APPEARANCE: NORMAL
SEMEN LIQUEFACTION: NORMAL
SEMEN VISCOSITY: NORMAL
TOT. NO OF NORM. MOTILE SPERM (SEMEN): 0.43 MILL
TOT. NO OF NORM. SPERM (SEMEN): 0.16 MILL
TOTAL MOTILITY (SEMEN): 37 % (ref 40–?)
TOTAL NO OF MOTILE (SEMEN): 4.33 MILL
TOTAL NO OF RND CELLS (SEMEN): 0.5 MILL (ref ?–5)
TOTAL NO OF SPERM (SEMEN): 11.57 MILL (ref 39–?)
VOLUME (SEMEN): 1.4 ML (ref 1.5–?)

## 2023-12-20 PROCEDURE — 89322 SEMEN ANAL STRICT CRITERIA: CPT | Performed by: OBSTETRICS & GYNECOLOGY

## 2023-12-26 ENCOUNTER — SPECIALTY PHARMACY (OUTPATIENT)
Dept: PHARMACY | Facility: CLINIC | Age: 43
End: 2023-12-26

## 2023-12-27 ENCOUNTER — PHARMACY VISIT (OUTPATIENT)
Dept: PHARMACY | Facility: CLINIC | Age: 43
End: 2023-12-27
Payer: MEDICAID

## 2023-12-27 ENCOUNTER — SPECIALTY PHARMACY (OUTPATIENT)
Dept: PHARMACY | Facility: CLINIC | Age: 43
End: 2023-12-27

## 2024-01-08 ENCOUNTER — TELEMEDICINE (OUTPATIENT)
Dept: UROLOGY | Facility: HOSPITAL | Age: 44
End: 2024-01-08
Payer: COMMERCIAL

## 2024-01-08 DIAGNOSIS — E29.1 HYPOGONADISM MALE: ICD-10-CM

## 2024-01-08 DIAGNOSIS — Z12.5 PROSTATE CANCER SCREENING: ICD-10-CM

## 2024-01-08 DIAGNOSIS — N46.01 AZOOSPERMIA: ICD-10-CM

## 2024-01-08 DIAGNOSIS — N46.9 INFERTILITY MALE: Primary | ICD-10-CM

## 2024-01-08 PROCEDURE — 99213 OFFICE O/P EST LOW 20 MIN: CPT | Performed by: UROLOGY

## 2024-01-08 NOTE — PROGRESS NOTES
Virtual or Telephone Consent    An interactive audio and video telecommunication system which permits real time communications between the patient (at the originating site) and provider (at the distant site) was utilized to provide this telehealth service.     HPI  43 y.o. M referred to me by self for infertility.         Last visit 12/04/23  -discussed reboot protocol  -Reboot: hcg 3000 qod, clomid 25 M-F, anastrozole 1mg weekly  -stop T and any anabolics etc  -will look at teratogenicity of hepC meds  -fu after SA and fertility fu labs         Today's visit:  #Azoospermia/infertility  -hcg 3000 qod, clomid 25 M-F, anastrozole 1mg weekly -->  -  Started hcg/clomid/anastrozole ([facility] notes reviewed)  -    Partner/wife- girlfriend   Partner/wife age: 30    for: dating   Attempting Pregnancy for: 1 year   Previous pregnancies for either partner: patient states had a pregnancy with a previous partner     Testicles doubled in size per pt  GI/hepatology notes reviewed     Previous Semen Analysis:    June 2023: normal volume azoo  SA 9/14/23: Vol 1.6, Conc 0, Total motility 0%, Prog mot 0%, Total # of sperm 0, Total motile 0.   Labs 8/28/23: 17-HP 18.71, T 992, PRL 7.1, FSH <0.9, LH <0.1, EST 28.  now off for 2 months, was on for 2 years     PREVIOUS RISK FACTORS  On T in past     PRIOR Assisted Reproductive Technology: IVF/IUI: none     Component      Latest Ref Rng 12/20/2023   Volume (Semen)      1.5 mL 1.4 !    Concentration(Semen)      15 mill/mL 8.27 !    Total Motility (Semen)      40 % 37 !    Prog. Motility (Semen)      32 % 33    Non Prog. Motility (Semen)      % 4    Total No of Sperm (Semen)      39 mill 11.57 !    Total No of Motile (Semen)      mill 4.33    Total No of Rnd Cells (Semen)      5 mill 0.5    Leukocyte (Semen) NOT PERFORMED    % Normal (Semen)      4 % 3.8 !    % Head defects (Semen)      % 96.3    % Neck Midpiece (Semen)      % 19.3    % Tail defects (Semen)      % 6.0    % Ex Residual  Cytoplasm (Semen)      % 0.5    Tot. No of Norm. Motile Sperm (Semen)      mill 0.434    Tot. No of Norm. Sperm (Semen)      mill 0.162        Labs  Lab Results   Component Value Date    TESTOSTERONE 563 12/04/2023    TESTOSTERONE 992 08/28/2023     Lab Results   Component Value Date    LH 11.6 12/04/2023     Lab Results   Component Value Date    FSH <0.9 08/28/2023     Lab Results   Component Value Date    ESTRADIOL <20 12/04/2023     Lab Results   Component Value Date    PSA 0.36 12/04/2023     Lab Results   Component Value Date    HCT 47.8 12/04/2023     PMH:  Past Medical History:   Diagnosis Date    Personal history of other diseases of the respiratory system 02/04/2015    History of acute bronchitis    Personal history of other specified conditions 01/13/2020    History of heartburn    Tobacco abuse counseling 12/06/2019    Encounter for smoking cessation counseling    Unspecified otitis externa, left ear 02/04/2015    Left otitis externa        PSH:  Past Surgical History:   Procedure Laterality Date    OTHER SURGICAL HISTORY  02/04/2015    Leg Repair        Medications:    Current Outpatient Medications:     anastrozole (Arimidex) 1 mg tablet, TAKE 1MG WEEKLY AS DIRECTED BY PROVIDER, Disp: 4 tablet, Rfl: 3    chorionic gonadotropin (Pregnyl) 10,000 unit injection, INJECT 3,000 UNITS UNDER THE SKIN EVERY OTHER DAY AS DIRECTED PER PROVIDER., Disp: 5 each, Rfl: 3    clomiPHENE (Clomid) 50 mg tablet, TAKE 25MG (1/2 TABLET) BY MOUTH ONCE DAILY MONDAY- FRIDAY AS DIRECTED BY PROVIDER., Disp: 10 tablet, Rfl: 3    glecaprevir-pibrentasvir (Mavyret) 100-40 mg tablet tablet, Take three (3) tablets by mouth once daily., Disp: 168 tablet, Rfl: 0    ibuprofen 800 mg tablet, Take 1 tablet (800 mg) by mouth 3 times a day as needed for mild pain (1 - 3) (pain)., Disp: 180 tablet, Rfl: 0    methocarbamol (Robaxin) 500 mg tablet, Take 1 tablet (500 mg) by mouth every 12 hours if needed for muscle spasms., Disp: 60 tablet, Rfl:  0    Allergy:  No Known Allergies     Exam  CONSTITUTIONAL:        No acute distress    HEAD:        Normocephalic and atraumatic    CHEST / RESPIRATORY      no excess work of breathing, no respiratory distress,    ABDOMEN / GASTROINTESTINAL:        Abdomen nondistended      Assessment/Plan  #Azoospermia, 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 Discussed reboot protocol with clomid/anastrozole/hcg. if no improvement after on reboot, will consider genetic testing.  -Reboot: hcg 3000 qod, clomid 25 M-F, anastrozole 1mg weekly  -stop T and any anabolics etc    SA and fertility labs in 3 months.    Scribe Attestation  By signing my name below, I, Demi Panchal-Bryn , Scribe   attest that this documentation has been prepared under the direction and in the presence of Ashu Frazier MD.

## 2024-01-10 ENCOUNTER — SPECIALTY PHARMACY (OUTPATIENT)
Dept: PHARMACY | Facility: CLINIC | Age: 44
End: 2024-01-10

## 2024-01-10 ENCOUNTER — PHARMACY VISIT (OUTPATIENT)
Dept: PHARMACY | Facility: CLINIC | Age: 44
End: 2024-01-10
Payer: COMMERCIAL

## 2024-01-10 PROCEDURE — RXMED WILLOW AMBULATORY MEDICATION CHARGE

## 2024-01-10 NOTE — PROGRESS NOTES
Outreach was made to the patient on several days to inquire if he was ready to start HCV therapy. The patient returned my call today. HE stated that he saw his fertility provider yesterday and was told to pursue fertility treatment x 3 months and is planning to start HCV treatment after that. He did have questions about potential teratogenicity of the Mavyret as he is planning to conceive. He was told that I would reach out to the drug company to find out if they have additional information. He verbalized understanding.    Patient states that he received 2 boxes of the Mavyret medication and will store them at room temp until he is ready to start. He was in need of refills of his fertility meds and he was told that I would ask one of the PSAs to give him a call.

## 2024-01-10 NOTE — PROGRESS NOTES
[facility] SPECIALTY PHARMACY PATIENT REASSESSMENT NOTE    [facility] SUPPLIED MEDICATION:      Clomid 50mg tablets, Anastrozole 1mg tablets, and Pregnyl/HCG 10,000 unit vials    The patient's care will be continued with the referring prescriber.     TOLERANCE:   Have you experienced any side effects from this medication? No    Are there any changes to current therapy regimen? No    EFFICACY:    Have you developed any new symptoms of your condition? No    How do you feel your medication is affecting your disease state? Feels ok on medications, continuing for another 3 months     GOALS:  Your goals of therapy are:  Ultimate goal of achieving a viable pregnancy with their partner.    COMPLIANCE / ADHERENCE:  Have you had any unplanned missed doses? No    What barriers to adherence does the patient have? (Answer Y/N for all):  Patient has a difficult time remembering to take medications?  No  Difficult administration technique? No  Medication cost? No  Patient does not think medication is beneficial? No  Other?   What actions were taken to address barriers? No barriers     Does the patient have any barriers to self-administration (including physical and mental)? No      GENERAL ASSESSMENT:  Are there any changes to your home medications, OTCs or supplements? No    Do you have any new allergies? No     Have you been diagnosed with any new medical conditions? No    PATIENT MANAGEMENT:    Do you have any questions regarding your medications, or care? No    Do you have any concerns with access to your medications? No    How would you classify your Quality of Life on a scale of 1-10? 10    How would you describe your satisfaction with  Specialty Pharmacy services on a scale of 1-10?  10    Do you have any additional suggestions to improve  Specialty Pharmacy services:     IMPRESSION/PLAN:  Is patient high risk? Yes- Hep C- holding off on starting therapy until fertility treatment is completed.     Is laboratory  follow-up needed? No    Is a clinical intervention needed? No    Next reassessment date? 3 months    Additional comments: Scheduled delivery for refills of HCG 3000, Clomid 25mg PO M-F, and Anastrozole 1mg weekly for 1/11/24.    Patient will be on fertility medications for 3 more months and plans to follow up with Dr. Frazier.       Tasneem Navarro (Katie), PharmMARV  [facility]  Clinical Pharmacy Specialist- Fertility   [facility], [place] [address]  Email: [email or web address]  Tel: [phone]       Fax: [phone]

## 2024-01-11 DIAGNOSIS — N46.01 AZOOSPERMIA: ICD-10-CM

## 2024-01-11 RX ORDER — ANASTROZOLE 1 MG/1
TABLET ORAL
Qty: 4 TABLET | Refills: 3 | Status: SHIPPED | OUTPATIENT
Start: 2024-01-11 | End: 2024-05-09 | Stop reason: HOSPADM

## 2024-01-11 RX ORDER — CHORIONIC GONADOTROPIN 10000 UNIT
KIT INTRAMUSCULAR
Qty: 5 EACH | Refills: 3 | Status: SHIPPED | OUTPATIENT
Start: 2024-01-11 | End: 2024-05-09 | Stop reason: HOSPADM

## 2024-01-11 RX ORDER — CLOMIPHENE CITRATE 50 MG/1
TABLET ORAL
Qty: 10 TABLET | Refills: 3 | Status: SHIPPED | OUTPATIENT
Start: 2024-01-11 | End: 2024-05-09 | Stop reason: HOSPADM

## 2024-01-11 NOTE — PROGRESS NOTES
Sent in medication refills to  Specialty pharmacy per protocol under Dr. Frazier. OK to send in refills until his next follow up appointment in 3 months.     Medications re-ordered with 3 refills each:   Pregnyl 3,000 units subcutaneous every other day   Clomid 25mg by mouth Monday- Friday   Anastrozole 1mg by mouth once weekly      Tasneem Navarro (Katie), PharmMARV  [facility]  Clinical Pharmacy Specialist- Fertility   [facility], [address] [place]  [address] [place] [address]  Email: [email or web address]  Tel: [phone]       Fax: [phone]

## 2024-01-12 ENCOUNTER — SPECIALTY PHARMACY (OUTPATIENT)
Dept: PHARMACY | Facility: CLINIC | Age: 44
End: 2024-01-12

## 2024-01-22 DIAGNOSIS — B18.2 CHRONIC HEPATITIS C VIRUS GENOTYPE 3 INFECTION (MULTI): ICD-10-CM

## 2024-01-22 RX ORDER — GLECAPREVIR AND PIBRENTASVIR 40; 100 MG/1; MG/1
3 TABLET, FILM COATED ORAL DAILY
Qty: 168 TABLET | Refills: 0 | OUTPATIENT
Start: 2024-01-22 | End: 2024-03-16

## 2024-01-29 ENCOUNTER — SPECIALTY PHARMACY (OUTPATIENT)
Dept: PHARMACY | Facility: CLINIC | Age: 44
End: 2024-01-29

## 2024-01-29 NOTE — PROGRESS NOTES
Attempted the patient 5 times to follow up on his question regarding Mavyret use in patients trying to conceive. Messages were left with my direct extension.

## 2024-02-22 ENCOUNTER — SPECIALTY PHARMACY (OUTPATIENT)
Dept: PHARMACY | Facility: CLINIC | Age: 44
End: 2024-02-22

## 2024-02-22 ENCOUNTER — PHARMACY VISIT (OUTPATIENT)
Dept: PHARMACY | Facility: CLINIC | Age: 44
End: 2024-02-22
Payer: COMMERCIAL

## 2024-02-22 PROCEDURE — RXMED WILLOW AMBULATORY MEDICATION CHARGE

## 2024-02-29 ENCOUNTER — OFFICE VISIT (OUTPATIENT)
Dept: PRIMARY CARE | Facility: CLINIC | Age: 44
End: 2024-02-29
Payer: COMMERCIAL

## 2024-02-29 VITALS
OXYGEN SATURATION: 95 % | SYSTOLIC BLOOD PRESSURE: 138 MMHG | BODY MASS INDEX: 32.11 KG/M2 | DIASTOLIC BLOOD PRESSURE: 78 MMHG | HEART RATE: 84 BPM | WEIGHT: 250.2 LBS | HEIGHT: 74 IN

## 2024-02-29 DIAGNOSIS — G89.29 CHRONIC LUMBOSACRAL PAIN: Primary | ICD-10-CM

## 2024-02-29 DIAGNOSIS — M54.50 CHRONIC LUMBOSACRAL PAIN: Primary | ICD-10-CM

## 2024-02-29 DIAGNOSIS — M54.50 CHRONIC BILATERAL LOW BACK PAIN WITHOUT SCIATICA: ICD-10-CM

## 2024-02-29 DIAGNOSIS — G89.29 CHRONIC BILATERAL LOW BACK PAIN WITHOUT SCIATICA: ICD-10-CM

## 2024-02-29 PROBLEM — J02.9 ACUTE PHARYNGITIS: Status: RESOLVED | Noted: 2023-08-29 | Resolved: 2024-02-29

## 2024-02-29 PROBLEM — R07.9 CHEST PAIN: Status: RESOLVED | Noted: 2023-08-29 | Resolved: 2024-02-29

## 2024-02-29 PROCEDURE — 99213 OFFICE O/P EST LOW 20 MIN: CPT | Performed by: NURSE PRACTITIONER

## 2024-02-29 PROCEDURE — 1036F TOBACCO NON-USER: CPT | Performed by: NURSE PRACTITIONER

## 2024-02-29 RX ORDER — LIDOCAINE 50 MG/G
1 PATCH TOPICAL DAILY
Qty: 14 PATCH | Refills: 1 | Status: SHIPPED | OUTPATIENT
Start: 2024-02-29

## 2024-02-29 RX ORDER — IBUPROFEN 800 MG/1
800 TABLET ORAL 3 TIMES DAILY PRN
Qty: 180 TABLET | Refills: 0 | Status: SHIPPED | OUTPATIENT
Start: 2024-02-29 | End: 2024-05-09 | Stop reason: HOSPADM

## 2024-02-29 RX ORDER — PREDNISONE 20 MG/1
TABLET ORAL
Qty: 21 TABLET | Refills: 0 | Status: SHIPPED | OUTPATIENT
Start: 2024-02-29 | End: 2024-05-09 | Stop reason: HOSPADM

## 2024-02-29 ASSESSMENT — ENCOUNTER SYMPTOMS
DEPRESSION: 0
OCCASIONAL FEELINGS OF UNSTEADINESS: 0
LOSS OF SENSATION IN FEET: 0

## 2024-02-29 ASSESSMENT — PAIN SCALES - GENERAL: PAINLEVEL: 10-WORST PAIN EVER

## 2024-02-29 NOTE — PROGRESS NOTES
"Chas Castanon \"Franco\" is a 43 y.o. 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   Pt has never seen a back specialist  I ordered L/S Xray in June 2023----> NEGATIVE   Pt will take Ibuprofen 800 mg at bedtime which helps a little, has never used Lidocaine patches  Will refer to Ortho for further eval/imaging     Will do Prednisone taper     Pt has some information on a company that does a procedure called \"[employer]\" that is in [place] that he is interested in doing that, but they require an MRI       Review of Systems  All 13 systems were reviewed and are within normal limits except positive and pertinent negative responses which are noted below or in HPI.      Objective   Vitals:  /78 (BP Location: Left arm)   Pulse 84   Ht 1.88 m (6' 2\")   Wt 113 kg (250 lb 3.2 oz)   SpO2 95%   BMI 32.12 kg/m²       Current Outpatient Medications   Medication Instructions    anastrozole (Arimidex) 1 mg tablet TAKE 1 TABLET (1MG) ONCE WEEKLY AS DIRECTED BY PROVIDER    chorionic gonadotropin (Pregnyl) 10,000 unit injection INJECT 3,000 UNITS UNDER THE SKIN EVERY OTHER DAY AS DIRECTED PER PROVIDER.    clomiPHENE (Clomid) 50 mg tablet TAKE 25MG (1/2 TABLET) BY MOUTH ONCE DAILY MONDAY- FRIDAY AS DIRECTED BY PROVIDER.    ibuprofen 800 mg, oral, 3 times daily PRN    lidocaine (Lidoderm) 5 % patch 1 patch, transdermal, Daily, Apply to painful area 12 hours per day, remove for 12 hours.    methocarbamol (ROBAXIN) 500 mg, oral, Every 12 hours PRN    predniSONE (Deltasone) 20 mg tablet Take 3 tabs (60mg) daily for 5 days, then take 2 tabs (40mg) daily for 2 days, then take 1 tab (20mg) daily for 2 days.         Physical Exam  Vitals " reviewed.   Pulmonary:      Effort: Pulmonary effort is normal.   Musculoskeletal:         General: Tenderness present.      Right lower leg: No edema.      Left lower leg: No edema.   Skin:     General: Skin is warm and dry.   Neurological:      Deep Tendon Reflexes:      Reflex Scores:       Patellar reflexes are 1+ on the right side and 1+ on the left side.        Assessment/Plan   Problem List Items Addressed This Visit             ICD-10-CM    Chronic bilateral low back pain without sciatica M54.50, G89.29    Relevant Medications    ibuprofen 800 mg tablet     Other Visit Diagnoses         Codes    Chronic lumbosacral pain    -  Primary M54.50, G89.29    Relevant Medications    predniSONE (Deltasone) 20 mg tablet    lidocaine (Lidoderm) 5 % patch    Other Relevant Orders    Referral to Orthopaedic Surgery

## 2024-02-29 NOTE — PATIENT INSTRUCTIONS
Thank you for seeing me today.  It was a pleasure to see you again!    Rx Prednisone taper  Rx Lidocaine Patches  Rx Ibuprofen 800 mg     [facility] Pain management: Dr. Fritz, Dr. Garcia and Dr. Loomis  #[phone]    See Ortho

## 2024-03-04 NOTE — PROGRESS NOTES
"[facility]  New Patient Office Visit Note 3/6/2024    Patient Information: Chas Castanon MRN: [id number], : 1980   Primary Care/Referring Physician: MATY Babb-CNP, [address] [facility], [address] / [place]*     Chief Complaint: Low back pain  History of Pain: Mr. Chas Castanon is a 43 y.o. male with a PMHx of prior opioid use disorder who presents for low back pain.    He reports onset of pain on 23 after a coughing fit with sudden onset of pain. At that time he had severe pain which made standing difficult. He was treated conservatively with anti-inflammatory medications and chiropractic treatment. He was making some gradual improvement but then 3 weeks ago his pain worsened again significantly. He reports significant weight gain due to inability to exercise and poor diet.     He describes pain near his lumbosacral junction with occasional radiation down towards his buttock/lateral hip bilaterally, and very occasionally all the way down his legs. He has pain with prolonged sitting, twisting, and learning forward. When trying to straighten his back after bending over he feels that it gets \"stuck\". His pain improves slightly with standing. He denies any numbness or tingling in his legs.  He was recently prescribed PO Prednisone with significant improvement but can only take this for a few more days.     Current Pain Medications: Ibuprofen 800 mg TID PRN - minimal pain relief  Previously Tried Pain Medications: Methocarbamol, PO steroids    Relevant Surgeries: Denies  Injections: Denies  Physical/Occupational Therapy: Has seen a chiropractor in the past.    Medications:   Current Outpatient Medications   Medication Instructions    anastrozole (Arimidex) 1 mg tablet TAKE 1 TABLET (1MG) ONCE WEEKLY AS DIRECTED BY PROVIDER    chorionic gonadotropin (Pregnyl) 10,000 unit injection INJECT 3,000 UNITS UNDER THE SKIN EVERY OTHER DAY AS DIRECTED PER PROVIDER.    " clomiPHENE (Clomid) 50 mg tablet TAKE 25MG (1/2 TABLET) BY MOUTH ONCE DAILY MONDAY- FRIDAY AS DIRECTED BY PROVIDER.    diclofenac (VOLTAREN) 75 mg, oral, 2 times daily, Do not crush, chew, or split.    ibuprofen 800 mg, oral, 3 times daily PRN    lidocaine (Lidoderm) 5 % patch 1 patch, transdermal, Daily, Apply to painful area 12 hours per day, remove for 12 hours.    methocarbamol (ROBAXIN) 500 mg, oral, Every 12 hours PRN    predniSONE (Deltasone) 20 mg tablet Take 3 tabs (60mg) daily for 5 days, then take 2 tabs (40mg) daily for 2 days, then take 1 tab (20mg) daily for 2 days.      Allergies: No Known Allergies    Past Medical & Surgical History:  Past Medical History:   Diagnosis Date    Back pain     Chronic pain disorder     Personal history of other diseases of the respiratory system 02/04/2015    History of acute bronchitis    Personal history of other specified conditions 01/13/2020    History of heartburn    Tobacco abuse counseling 12/06/2019    Encounter for smoking cessation counseling    Unspecified otitis externa, left ear 02/04/2015    Left otitis externa      Past Surgical History:   Procedure Laterality Date    LEG SURGERY Right     2006 crushed -crane at work    OTHER SURGICAL HISTORY  02/04/2015    Leg Repair       Family History   Problem Relation Name Age of Onset    No Known Problems Mother      No Known Problems Father       Social History     Socioeconomic History    Marital status: Single     Spouse name: Not on file    Number of children: Not on file    Years of education: Not on file    Highest education level: Not on file   Occupational History    Not on file   Tobacco Use    Smoking status: Former     Types: Cigarettes     Passive exposure: Current    Smokeless tobacco: Never   Vaping Use    Vaping Use: Every day    Substances: Nicotine, Flavoring   Substance and Sexual Activity    Alcohol use: Not Currently    Drug use: Never    Sexual activity: Not on file   Other Topics Concern     "Not on file   Social History Narrative    Not on file     Social Determinants of Health     Financial Resource Strain: Not on file   Food Insecurity: Not on file   Transportation Needs: Not on file   Physical Activity: Not on file   Stress: Not on file   Social Connections: Not on file   Intimate Partner Violence: Not on file   Housing Stability: Not on file       Problems, Past medical history, past surgical history, Medications, allergies, social and family history reviewed and as per the electronic medical record from today's encounter    Review of Systems:  CONST: No fever, chills, fatigue, weight changes  EYES: No loss of vision  ENT: No hearing loss, tinnitus  CV: No chest pain, palpitations  RESP: No dyspnea, shortness of breath, cough  GI: No stool incontinence, nausea, vomiting  : No urinary incontinence  MSK: No joint swelling  SKIN: No rash, no hives  NEURO: No headache, dizziness, weakness, paresthesias  PSYCH: No anxiety, depression  HEM/LYMPH: No easy bruising or bleeding  All other systems reviewed are negative     Physical Exam:  Vitals: /68   Pulse 66   Resp 16   Ht 1.88 m (6' 2\")   Wt 113 kg (250 lb)   SpO2 98%   BMI 32.10 kg/m²   General: No apparent distress. Alert, appropriate, oriented x 3. Mood and affect normal. Speaking in full sentences.  HENT: Normocephalic, atraumatic. Hearing intact.  Eyes: Extraocular movements grossly intact. Pupils equal and round.   Neck: Supple, trachea midline.  Lungs: Symmetric respiratory excursion on visual exam, nonlabored breathing.  Extremities: No clubbing, cyanosis, or edema noted in arms or legs.  Skin: No rashes, lesions, alopecia noted on back or extremities.   Back: Reports mild pain with extension and lumbar facet loading maneuvers bilaterally. Reports severe pain with forward flexion. No tenderness over the spinous processes, lumbar facets, SIJ, or GTB bilaterally. Straight leg raise test negative bilaterally. No spasm noted over " "musculature. No misalignment or asymmetry noted.  Neuro: Alert and appropriate. Motor strength 5/5 throughout bilateral lower extremities. Sensory intact to light touch bilateral lower extremities. Gait within normal limits. Bulk and tone within normal limits.    Laboratory Data:  The following laboratory data were reviewed during this visit:   Lab Results   Component Value Date    WBC 6.8 12/06/2019    RBC 5.03 12/06/2019    HGB 16.3 12/06/2019    HCT 47.8 12/04/2023     12/06/2019      No results found for: \"INR\"  Lab Results   Component Value Date    CREATININE 0.90 12/06/2019       Imaging:  The following imaging impressions were reviewed by me during this visit:    -6/30/23 lumbar spine xray overall unremarkable    I also personally reviewed the images from the above studies myself. These images and my interpretation of them contributed to the management and decision making of the patient's medical plan.    ASSESSMENT:  Mr. Chas Castanon is a 43 y.o. male with low back and leg pain that is consistent with:    1. Lumbar radiculopathy    2. Vertebrogenic low back pain    3. DDD (degenerative disc disease), lumbar        PLAN:    Diagnostics:   -I reviewed his lumbar spine x-ray which was overall fairly normal  However, given that his pain has persisted for 9 months and is very severe, we need a lumbar spine MRI to evaluate for a cause of his severe midline low back pain as well as for interventional planning purposes.     Physical Therapy and Rehabilitation:     -I do not think he should be participating in physical therapy at this time. This would likely exacerbate his pain and make him worse    Psychologically:  - No needs at this time    Medications  -He is currently finishing a PO Prednisone taper. If his pain worsens after this may start PO Diclofenac 75 mg BID.  Education on this medication provided today.  Side effects reviewed.  He will stop all other NSAIDs    Duration  - 9 " months    Interventions:  -The exact etiology of his pain remains unclear but may represent pain secondary to lumbar radiculopathy, vertebrogenic low back pain, and lumbar facet arthropathy.  Will await the results of his lumbar spine MRI and may consider intervention in the future        Sincerely,  Tomy Loomis MD  [facility] - [place]

## 2024-03-06 ENCOUNTER — OFFICE VISIT (OUTPATIENT)
Dept: PAIN MEDICINE | Facility: CLINIC | Age: 44
End: 2024-03-06
Payer: COMMERCIAL

## 2024-03-06 VITALS
HEART RATE: 66 BPM | RESPIRATION RATE: 16 BRPM | DIASTOLIC BLOOD PRESSURE: 68 MMHG | WEIGHT: 250 LBS | HEIGHT: 74 IN | SYSTOLIC BLOOD PRESSURE: 102 MMHG | BODY MASS INDEX: 32.08 KG/M2 | OXYGEN SATURATION: 98 %

## 2024-03-06 DIAGNOSIS — M51.36 DDD (DEGENERATIVE DISC DISEASE), LUMBAR: ICD-10-CM

## 2024-03-06 DIAGNOSIS — M54.51 VERTEBROGENIC LOW BACK PAIN: ICD-10-CM

## 2024-03-06 DIAGNOSIS — M54.16 LUMBAR RADICULOPATHY: Primary | ICD-10-CM

## 2024-03-06 PROCEDURE — 99214 OFFICE O/P EST MOD 30 MIN: CPT | Performed by: STUDENT IN AN ORGANIZED HEALTH CARE EDUCATION/TRAINING PROGRAM

## 2024-03-06 PROCEDURE — 99204 OFFICE O/P NEW MOD 45 MIN: CPT | Performed by: STUDENT IN AN ORGANIZED HEALTH CARE EDUCATION/TRAINING PROGRAM

## 2024-03-06 PROCEDURE — 1036F TOBACCO NON-USER: CPT | Performed by: STUDENT IN AN ORGANIZED HEALTH CARE EDUCATION/TRAINING PROGRAM

## 2024-03-06 RX ORDER — DICLOFENAC SODIUM 75 MG/1
75 TABLET, DELAYED RELEASE ORAL 2 TIMES DAILY
Qty: 60 TABLET | Refills: 1 | Status: SHIPPED | OUTPATIENT
Start: 2024-03-06 | End: 2024-05-09 | Stop reason: HOSPADM

## 2024-03-06 ASSESSMENT — PAIN SCALES - GENERAL
PAINLEVEL_OUTOF10: 9
PAINLEVEL: 9

## 2024-03-06 ASSESSMENT — PATIENT HEALTH QUESTIONNAIRE - PHQ9
SUM OF ALL RESPONSES TO PHQ9 QUESTIONS 1 AND 2: 0
1. LITTLE INTEREST OR PLEASURE IN DOING THINGS: NOT AT ALL
2. FEELING DOWN, DEPRESSED OR HOPELESS: NOT AT ALL

## 2024-03-06 ASSESSMENT — PAIN - FUNCTIONAL ASSESSMENT: PAIN_FUNCTIONAL_ASSESSMENT: 0-10

## 2024-03-06 ASSESSMENT — LIFESTYLE VARIABLES: TOTAL SCORE: 16

## 2024-03-06 ASSESSMENT — PAIN DESCRIPTION - DESCRIPTORS: DESCRIPTORS: SHOOTING;SHARP

## 2024-03-18 ENCOUNTER — SPECIALTY PHARMACY (OUTPATIENT)
Dept: PHARMACY | Facility: CLINIC | Age: 44
End: 2024-03-18

## 2024-03-18 NOTE — PROGRESS NOTES
Patient had questions about conceiving while on HCV therapy. Already attempted 5 times. Per the [facility] at [facility], they recommend to wait until after conceiving to start the Mavyret. Patient returned my call today and relayed the above info. He requests a call back in about 2 months. He states he is still receiving the fertility injections. He has the Mavyret course at home.

## 2024-03-22 ENCOUNTER — APPOINTMENT (OUTPATIENT)
Dept: RADIOLOGY | Facility: CLINIC | Age: 44
End: 2024-03-22
Payer: COMMERCIAL

## 2024-03-27 ENCOUNTER — APPOINTMENT (OUTPATIENT)
Dept: ENDOCRINOLOGY | Facility: CLINIC | Age: 44
End: 2024-03-27
Payer: COMMERCIAL

## 2024-04-03 ENCOUNTER — SPECIALTY PHARMACY (OUTPATIENT)
Dept: PHARMACY | Facility: CLINIC | Age: 44
End: 2024-04-03

## 2024-04-04 NOTE — PROGRESS NOTES
The patient called today to inquire if he can start his HCV treatment. Advised that we were awaiting the completion of fertility treatment/ sperm retrieval prior to initiation of HCV therapy. Patient states he completed the fertility medication and that procedure was on hold for now as he has 2 herniated discs that require surgery. He states he is concerned that the surgeon won't be willing to operate if he has HCV. Advised that he check with their office if they would prefer that he receive treatment at this point or defer until after the procedure. He states he hasn't seen them yet and he is planning on going for the initial consultation on 4/12. He was advised to call me right after the appointment to review instructions if his surgeon states that it is okay to proceed with HCV therapy. He verbalized understanding.

## 2024-04-10 ENCOUNTER — ANCILLARY PROCEDURE (OUTPATIENT)
Dept: ENDOCRINOLOGY | Facility: CLINIC | Age: 44
End: 2024-04-10
Payer: COMMERCIAL

## 2024-04-10 DIAGNOSIS — N46.9 INFERTILITY MALE: ICD-10-CM

## 2024-04-10 LAB
% EX RESIDUAL CYTOPLASM (SEMEN): 0 %
% HEAD DEFECTS (SEMEN): 94.5 %
% NECK MIDPIECE (SEMEN): 35.8 %
% NORMAL (SEMEN): 4 % (ref 4–?)
% TAIL DEFECTS (SEMEN): 7.8 %
ABSTINENCE (DAYS): 2 DAYS (ref 2–7)
AGGLUTINATION (SEMEN): NO
ANALYZED TIME:: ABNORMAL
ANDROLOGY LAB ID#: ABNORMAL
CLUMPS (SEMEN): NO
COLLECTED COMPLETELY: YES
COLLECTION LOCATION:: ABNORMAL
COLLECTION METHOD:: ABNORMAL
CONCENTRATION(SEMEN): 8.12 MILL/ML (ref 15–?)
DEBRIS (SEMEN): YES
NON PROG. MOTILITY (SEMEN): 11 %
PROG. MOTILITY (SEMEN): 40 % (ref 32–?)
RECEIVED TIME:: ABNORMAL
SEMEN APPEARANCE: NORMAL
SEMEN LIQUEFACTION: NORMAL
SEMEN VISCOSITY: NORMAL
TOT. NO OF NORM. MOTILE SPERM (SEMEN): 0.17 MILL
TOT. NO OF NORM. SPERM (SEMEN): 0.33 MILL
TOTAL MOTILITY (SEMEN): 51 % (ref 40–?)
TOTAL NO OF MOTILE (SEMEN): 4.15 MILL
TOTAL NO OF SPERM (SEMEN): 8.12 MILL (ref 39–?)
VOLUME (SEMEN): 1 ML (ref 1.5–?)

## 2024-04-10 PROCEDURE — 89322 SEMEN ANAL STRICT CRITERIA: CPT | Performed by: OBSTETRICS & GYNECOLOGY

## 2024-04-12 ENCOUNTER — OFFICE VISIT (OUTPATIENT)
Dept: ORTHOPEDIC SURGERY | Facility: CLINIC | Age: 44
End: 2024-04-12
Payer: COMMERCIAL

## 2024-04-12 DIAGNOSIS — M54.50 CHRONIC LUMBOSACRAL PAIN: ICD-10-CM

## 2024-04-12 DIAGNOSIS — M54.16 LUMBAR RADICULOPATHY: Primary | ICD-10-CM

## 2024-04-12 DIAGNOSIS — M51.26 DISPLACEMENT OF LUMBAR INTERVERTEBRAL DISC WITHOUT MYELOPATHY: ICD-10-CM

## 2024-04-12 DIAGNOSIS — G89.29 CHRONIC LUMBOSACRAL PAIN: ICD-10-CM

## 2024-04-12 PROCEDURE — 99205 OFFICE O/P NEW HI 60 MIN: CPT | Performed by: ORTHOPAEDIC SURGERY

## 2024-04-12 NOTE — LETTER
April 15, 2024     Moni Marie, APRN-CNP  [address]  [facility], [address]  [facility] [id number]    Patient: Franco Castanon   YOB: 1980   Date of Visit: 4/12/2024       Dear Dr. Moni Marie, APRN-CNP:    Thank you for referring Franco Castanon to me for evaluation. Below are my notes for this consultation.  If you have questions, please do not hesitate to call me. I look forward to following your patient along with you.       Sincerely,     Vinicius Ruffin MD      CC: No Recipients  ______________________________________________________________________________________    HPI:Chas Castanon is a 43-year-old man, who comes in with complaints of back pain.  It started in June 2023.  The pain then radiates into the left leg.  It is worse with sitting.  He has not had physical therapy but he recently had an MRI done at outside facility which she brings with him today.      ROS:  Reviewed on EMR and patient intake sheet.    PMH/SH:  Reviewed on EMR and patient intake sheet.    Exam:  Physical Exam    Constitutional: Well appearing; no acute distress  Eyes: pupils are equal and round  Psych: normal affect  Respiratory: non-labored breathing  Cardiovascular: regular rate and rhythm  GI: non-distended abdomen  Musculoskeletal: no pain with range of motion of the hips bilaterally  Neurologic: [4+]/5 strength in the lower extremities bilaterally]; [positive left] straight leg raise    Radiology:     MRI demonstrates a left L5-S1 disc herniation with displacement traversing S1 nerve root as well as an annular tear at L4-5    Diagnosis:    Lumbar radiculopathy; lumbar disc displacement    Assessment and Plan:   43-year-old man, with lumbar radiculopathy.  Will begin with some physical therapy.  However, if the symptoms do not improve, then an L5-S1 microdiscectomy would be appropriate.  Relative CPT code includes: 18005.  We did talk about that surgery today.  We discussed surgery today at length,  including the specifics of the actual proposed procedure, the projected hospital course and post-operative recovery or rehabilitation, and the expected results of this surgery.  The potential benefits and risks of the proposed surgery include, but are not limited to those of infection, spinal fluid leaks, nerve injury or paralysis, whether that be complete or partial paralysis, foot drop, instrumentation failure, non-union or latent instability, future adjacent segment disease, epidural hematoma, wound dehiscence, reherniation, persistent pain or paresthesias, and the general risks of anaesthesia including, but not limited to those of stroke, heart attack, respiratory difficulties and death.  The patient understands that there are no guarantees in regard to outcome or potential complications associated with the proposed procedure.  After this discussion, the patient articulated understanding of the topics covered and felt that all questions had been covered and answered satisfactorily.  The patient will be in contact with me, if his symptoms do not improve.    The patient was in agreement with the plan. At the end of the visit today, the patient felt that all questions had been answered satisfactorily.  The patient was pleased with the visit and very appreciative for the care rendered.     Thank you very much for the kind referral.  It is a privilege, and a pleasure, to partner with you in the care of your patients.  I would be delighted to assist you with any further consultations as needed.          Vinicius Ruffin MD    Chief of Spine Surgery, [facility]  Director of Spine Service, [facility]  , Department of Orthopaedics  [facility] [employer]  [address] [id number]  P: [phone]  [facility]    This note was dictated with voice recognition software.  It has not been  proofread for grammatical errors, typographical mistakes or other semantic inconsistencies.

## 2024-04-15 NOTE — PROGRESS NOTES
HPI:Chas Castanon is a 43-year-old man, who comes in with complaints of back pain.  It started in June 2023.  The pain then radiates into the left leg.  It is worse with sitting.  He has not had physical therapy but he recently had an MRI done at outside facility which she brings with him today.      ROS:  Reviewed on EMR and patient intake sheet.    PMH/SH:  Reviewed on EMR and patient intake sheet.    Exam:  Physical Exam    Constitutional: Well appearing; no acute distress  Eyes: pupils are equal and round  Psych: normal affect  Respiratory: non-labored breathing  Cardiovascular: regular rate and rhythm  GI: non-distended abdomen  Musculoskeletal: no pain with range of motion of the hips bilaterally  Neurologic: [4+]/5 strength in the lower extremities bilaterally]; [positive left] straight leg raise    Radiology:     MRI demonstrates a left L5-S1 disc herniation with displacement traversing S1 nerve root as well as an annular tear at L4-5    Diagnosis:    Lumbar radiculopathy; lumbar disc displacement    Assessment and Plan:   43-year-old man, with lumbar radiculopathy.  Will begin with some physical therapy.  However, if the symptoms do not improve, then an L5-S1 microdiscectomy would be appropriate.  Relative CPT code includes: 07737.  We did talk about that surgery today.  We discussed surgery today at length, including the specifics of the actual proposed procedure, the projected hospital course and post-operative recovery or rehabilitation, and the expected results of this surgery.  The potential benefits and risks of the proposed surgery include, but are not limited to those of infection, spinal fluid leaks, nerve injury or paralysis, whether that be complete or partial paralysis, foot drop, instrumentation failure, non-union or latent instability, future adjacent segment disease, epidural hematoma, wound dehiscence, reherniation, persistent pain or paresthesias, and the general risks of anaesthesia  including, but not limited to those of stroke, heart attack, respiratory difficulties and death.  The patient understands that there are no guarantees in regard to outcome or potential complications associated with the proposed procedure.  After this discussion, the patient articulated understanding of the topics covered and felt that all questions had been covered and answered satisfactorily.  The patient will be in contact with me, if his symptoms do not improve.    The patient was in agreement with the plan. At the end of the visit today, the patient felt that all questions had been answered satisfactorily.  The patient was pleased with the visit and very appreciative for the care rendered.     Thank you very much for the kind referral.  It is a privilege, and a pleasure, to partner with you in the care of your patients.  I would be delighted to assist you with any further consultations as needed.          Vinicius Ruffin MD    Chief of Spine Surgery, [facility]  Director of Spine Service, [facility]  , Department of Orthopaedics  [facility] [employer]  [address] [place] [address]  P: [phone]  [facility]    This note was dictated with voice recognition software.  It has not been proofread for grammatical errors, typographical mistakes or other semantic inconsistencies.

## 2024-04-15 NOTE — H&P (VIEW-ONLY)
HPI:Chas Castanon is a 43-year-old man, who comes in with complaints of back pain.  It started in June 2023.  The pain then radiates into the left leg.  It is worse with sitting.  He has not had physical therapy but he recently had an MRI done at outside facility which she brings with him today.      ROS:  Reviewed on EMR and patient intake sheet.    PMH/SH:  Reviewed on EMR and patient intake sheet.    Exam:  Physical Exam    Constitutional: Well appearing; no acute distress  Eyes: pupils are equal and round  Psych: normal affect  Respiratory: non-labored breathing  Cardiovascular: regular rate and rhythm  GI: non-distended abdomen  Musculoskeletal: no pain with range of motion of the hips bilaterally  Neurologic: [4+]/5 strength in the lower extremities bilaterally]; [positive left] straight leg raise    Radiology:     MRI demonstrates a left L5-S1 disc herniation with displacement traversing S1 nerve root as well as an annular tear at L4-5    Diagnosis:    Lumbar radiculopathy; lumbar disc displacement    Assessment and Plan:   43-year-old man, with lumbar radiculopathy.  Will begin with some physical therapy.  However, if the symptoms do not improve, then an L5-S1 microdiscectomy would be appropriate.  Relative CPT code includes: 32440.  We did talk about that surgery today.  We discussed surgery today at length, including the specifics of the actual proposed procedure, the projected hospital course and post-operative recovery or rehabilitation, and the expected results of this surgery.  The potential benefits and risks of the proposed surgery include, but are not limited to those of infection, spinal fluid leaks, nerve injury or paralysis, whether that be complete or partial paralysis, foot drop, instrumentation failure, non-union or latent instability, future adjacent segment disease, epidural hematoma, wound dehiscence, reherniation, persistent pain or paresthesias, and the general risks of anaesthesia  including, but not limited to those of stroke, heart attack, respiratory difficulties and death.  The patient understands that there are no guarantees in regard to outcome or potential complications associated with the proposed procedure.  After this discussion, the patient articulated understanding of the topics covered and felt that all questions had been covered and answered satisfactorily.  The patient will be in contact with me, if his symptoms do not improve.    The patient was in agreement with the plan. At the end of the visit today, the patient felt that all questions had been answered satisfactorily.  The patient was pleased with the visit and very appreciative for the care rendered.     Thank you very much for the kind referral.  It is a privilege, and a pleasure, to partner with you in the care of your patients.  I would be delighted to assist you with any further consultations as needed.          Vinicius Ruffin MD    Chief of Spine Surgery, [facility]  Director of Spine Service, [facility]  , Department of Orthopaedics  [facility] [employer]  [address] [place] [address]  P: [phone]  [facility]    This note was dictated with voice recognition software.  It has not been proofread for grammatical errors, typographical mistakes or other semantic inconsistencies.

## 2024-04-16 ENCOUNTER — EVALUATION (OUTPATIENT)
Dept: PHYSICAL THERAPY | Facility: CLINIC | Age: 44
End: 2024-04-16
Payer: COMMERCIAL

## 2024-04-16 DIAGNOSIS — M54.50 CHRONIC LUMBOSACRAL PAIN: ICD-10-CM

## 2024-04-16 DIAGNOSIS — G89.29 CHRONIC RADICULAR LUMBAR PAIN: Primary | ICD-10-CM

## 2024-04-16 DIAGNOSIS — M54.16 CHRONIC RADICULAR LUMBAR PAIN: Primary | ICD-10-CM

## 2024-04-16 DIAGNOSIS — G89.29 CHRONIC LUMBOSACRAL PAIN: ICD-10-CM

## 2024-04-16 PROCEDURE — 97161 PT EVAL LOW COMPLEX 20 MIN: CPT | Mod: GP | Performed by: PHYSICAL THERAPIST

## 2024-04-16 ASSESSMENT — ENCOUNTER SYMPTOMS
OCCASIONAL FEELINGS OF UNSTEADINESS: 0
LOSS OF SENSATION IN FEET: 0
DEPRESSION: 1

## 2024-04-16 NOTE — PROGRESS NOTES
"Physical Therapy    Physical Therapy Evaluation and Treatment    Patient Name: hCas Castanon \"Annel"  MRN: [id number]  Today's Date: 4/16/2024    Time Calculation  Start Time: 1415  Stop Time: 1500  Time Calculation (min): 45 min    Current Problem:   1. Chronic radicular lumbar pain        2. Chronic lumbosacral pain  Referral to Physical Therapy          Relevant Past Medical History: pt left PMHX blank   Surgical History: leg plate   Medications: Ibuprofen 800  Allergies: NKA    Precautions: NONE   STEADI Fall Risk: 3 (score of 4+ indicates fall risk)       SUBJECTIVE:   Pt is a 43-year-old man,  with complaints of low  back pain/Lt LE-calf Pt denies N/T.  Onset sx-coughing in shower-shooting- June 2023-DC rx, steroids, more manageable then now exacerbated a couple months ago bending over. Slips on shoes as fig 4 is painful.  Denies loss of Bowel bladder, foot drop, buckling of leg   Pt states he has tried press up, ham stretch towel, knees to chest and everything makes sx worse post performance.       Imaging: MRI per MD note\"demonstrates a left L5-S1 disc herniation with displacement traversing S1 nerve root as well as an annular tear at L4-5 \"  MD indicating micro discectomy if no improvement with PT     Pain:   Current: 5/10 LBP/left leg buttock  Lowest: 3/10  Highest: 10/10  Description: aching, throbbing, can shoot    Aggravating Factors: supine to sit and sit stand painful  Relieving Factors: unloaded   Sleep Pattern: side or back   Previous Interventions: DC, steroids, primary gave pt exercises but couldn't perform them without pain increasing        Red flags: no night sweats or unexplained loss, infact gain 40#    Occupation: construction owner,  sitting, desk work more now  Hobbies: none current  Home Living: multi story, stairs one a time leads x Rt  Prior Level of Function: before June 23 went gym, ran 3 miles a day    Patient/Family Goal: decrease pain     Outcome Measures: " TIMOTHY-60    OBJECTIVE:    Cognition: intact   Posture: forward head flexed, no lateral shift   Gait: antalgic decreased stance on his left leg   Functional Mobility: pain with bed transfers   Palpation: non tender L psp or sciatic n   Joint Mobility: ROM: LE, pain with range of motion of the hips-abduction  or SKTC   Pt is and fig to don socks and  shoes and painful   Lumbar ROM:  Flexion: 10 deg, 80% loss pain buttock  left  Extension: 10 deg , 75% loss shoots buttocks  Lateral flexion left right, 75% loss pain worse to left , side gliding no better hips to right  Rotation left 15 deg  right 10 deg, uncomfortable lower back     CLEVE pain with small attempt at SKTC  Hook painful, better supine legs extended   Rot legs to left in hook increased pain in leg   Prone no better/worse vs spine with legs extended, ALEK sx worse bothered neck  and upper back      Strength:  Psoas  RT 3, LT 2 pain  Quad  RT3+, 2 limited due to pain   Hip abd RT  4/5 4/5  Hip add RT 4/5 4/5 pain   TA RT 4, LT 3+  Ehl LT 4/5  FHL LT4/5, RT toes are pinned   Pt can heel raise bilateral     SPECIAL TEST:  SLR  left buttock 15 deg , right 40 LBP  OBED  Sensation to light touch intact    Reflex:   L4 present   S1 diminished    No goals set as pt not returning for follow up     Treatments:  Therapeutic Exercise: (12 minutes)  EIS increased pain    CLEVE pain with small attempt at SKTC  Hook painful, better supine legs extended   Rot legs to left in hook increased pain in leg   Prone no better/worse vs supine with legs exteded, ALEK sx worse   Side glides to right hips increased back pain   Manual Therapy: ( minutes)    Gait Training: ( minutes)    Neuromuscular Re-education: ( minutes)    Therapeutic Activity: (2 minutes)  OP Education: Pt educated to log roll, avoid BLT,keep items close to body, discussed sit stand desk       HEP:  Pt can try prone lying as tolerated a few minutes     Response to treatment: no better    ASSESSMENT:   Pt is a 43-year-old  man,  with complaints of low  back pain/Lt LE-calf Pt denies N/T.  Onset sx-coughing in shower-shooting- June 2023-DC rx, steroids, more manageable then now exacerbated a couple months ago bending over. Slips on shoes as fig 4 is painful.  Denies loss of Bowel bladder, foot drop, buckling of leg. Pt has painful loss of hip and lumbar arom, dural stretch sign, weakness LE go left.  Pt painful with transfers, could not sit 90/90 only with hips higher, antalgic gait.  Not finding a position or exercise that reduces pain some just not as painful as others. Discussed rx options including Aquatics-pt fearful to perform as he can get spasms, this could be a safety issue as well as difficulty getting dressed.  Pt with high TIMOTHY score of 60, co-morbidities:previous rt leg remote surgery decreased toe movement, obesity and self reported  depression with decreased function   At this time pt does not appear to be a PT rehab candidate due to high pain levels, poor mobility, weakness, dural stretch sign/+SLR and no exercise tolerated that improves sx.        PLAN:  He can try prone lying a few minutes a day as tolerated   Pt will follow up with MD for further direction   MD indicating micro discectomy if no improvement with PT per his notes   OP PT PLAN:  Treatment/Interventions:   PT Plan: Inital Assessment and Treament only    PT Frequency:   Duration:  Certification Period Start Date:   Certification Period End Date:   Visits Approved:   Rehab Potential: Poor  Plan of Care Agreement: Patient         Goals:   No goals set as pt not returning for follow up

## 2024-04-18 ENCOUNTER — SPECIALTY PHARMACY (OUTPATIENT)
Dept: PHARMACY | Facility: HOSPITAL | Age: 44
End: 2024-04-18
Payer: COMMERCIAL

## 2024-04-18 NOTE — PROGRESS NOTES
Called patient for pharmacist 3 month reassessment and to schedule refills (if needed). Touched base on where he was at with pursuing Hep C treatment. Patient is currently focused on having surgery for herniated disc. Holding off on fertility treatment at this time until surgery and then is going to make decisions about Hep C medication and re-starting male fertility medications.     He has my direct contact number when he is ready to re-start treatment.       Tasneem Navarro (Katie), PharmD  [facility]  Clinical Pharmacy Specialist- Fertility   [facility], Kiya Seymour  [address] [place] [address]  Email: [email or web address]  Tel: [phone]       Fax: [phone]

## 2024-04-19 ENCOUNTER — LAB (OUTPATIENT)
Dept: LAB | Facility: LAB | Age: 44
End: 2024-04-19
Payer: COMMERCIAL

## 2024-04-19 ENCOUNTER — TRANSCRIBE ORDERS (OUTPATIENT)
Dept: ORTHOPEDIC SURGERY | Facility: HOSPITAL | Age: 44
End: 2024-04-19
Payer: COMMERCIAL

## 2024-04-19 DIAGNOSIS — Z01.812 ENCOUNTER FOR PRE-OPERATIVE LABORATORY TESTING: ICD-10-CM

## 2024-04-19 DIAGNOSIS — M54.16 LUMBAR RADICULOPATHY: ICD-10-CM

## 2024-04-19 LAB
ALBUMIN SERPL BCP-MCNC: 4.8 G/DL (ref 3.4–5)
ALP SERPL-CCNC: 54 U/L (ref 33–120)
ALT SERPL W P-5'-P-CCNC: 222 U/L (ref 10–52)
ANION GAP SERPL CALC-SCNC: 15 MMOL/L (ref 10–20)
AST SERPL W P-5'-P-CCNC: 98 U/L (ref 9–39)
BILIRUB SERPL-MCNC: 0.8 MG/DL (ref 0–1.2)
BUN SERPL-MCNC: 15 MG/DL (ref 6–23)
CALCIUM SERPL-MCNC: 9.8 MG/DL (ref 8.6–10.3)
CHLORIDE SERPL-SCNC: 102 MMOL/L (ref 98–107)
CO2 SERPL-SCNC: 26 MMOL/L (ref 21–32)
CREAT SERPL-MCNC: 1.01 MG/DL (ref 0.5–1.3)
EGFRCR SERPLBLD CKD-EPI 2021: >90 ML/MIN/1.73M*2
ERYTHROCYTE [DISTWIDTH] IN BLOOD BY AUTOMATED COUNT: 11.9 % (ref 11.5–14.5)
GLUCOSE SERPL-MCNC: 89 MG/DL (ref 74–99)
HCT VFR BLD AUTO: 48 % (ref 41–52)
HGB BLD-MCNC: 16.7 G/DL (ref 13.5–17.5)
INR PPP: 1.1 (ref 0.9–1.1)
MCH RBC QN AUTO: 31.7 PG (ref 26–34)
MCHC RBC AUTO-ENTMCNC: 34.8 G/DL (ref 32–36)
MCV RBC AUTO: 91 FL (ref 80–100)
NRBC BLD-RTO: 0 /100 WBCS (ref 0–0)
PLATELET # BLD AUTO: 210 X10*3/UL (ref 150–450)
POTASSIUM SERPL-SCNC: 5 MMOL/L (ref 3.5–5.3)
PROT SERPL-MCNC: 7.3 G/DL (ref 6.4–8.2)
PROTHROMBIN TIME: 12.2 SECONDS (ref 9.8–12.8)
RBC # BLD AUTO: 5.27 X10*6/UL (ref 4.5–5.9)
SODIUM SERPL-SCNC: 138 MMOL/L (ref 136–145)
WBC # BLD AUTO: 3.5 X10*3/UL (ref 4.4–11.3)

## 2024-04-19 PROCEDURE — 80053 COMPREHEN METABOLIC PANEL: CPT

## 2024-04-19 PROCEDURE — 36415 COLL VENOUS BLD VENIPUNCTURE: CPT

## 2024-04-19 PROCEDURE — 85027 COMPLETE CBC AUTOMATED: CPT

## 2024-04-19 PROCEDURE — 85610 PROTHROMBIN TIME: CPT

## 2024-04-22 ENCOUNTER — APPOINTMENT (OUTPATIENT)
Dept: PHYSICAL THERAPY | Facility: CLINIC | Age: 44
End: 2024-04-22
Payer: COMMERCIAL

## 2024-05-09 ENCOUNTER — HOSPITAL ENCOUNTER (OUTPATIENT)
Facility: HOSPITAL | Age: 44
Setting detail: OUTPATIENT SURGERY
Discharge: HOME | End: 2024-05-09
Attending: ORTHOPAEDIC SURGERY | Admitting: ORTHOPAEDIC SURGERY
Payer: COMMERCIAL

## 2024-05-09 ENCOUNTER — PHARMACY VISIT (OUTPATIENT)
Dept: PHARMACY | Facility: CLINIC | Age: 44
End: 2024-05-09
Payer: MEDICAID

## 2024-05-09 ENCOUNTER — ANESTHESIA (OUTPATIENT)
Dept: OPERATING ROOM | Facility: HOSPITAL | Age: 44
End: 2024-05-09
Payer: COMMERCIAL

## 2024-05-09 ENCOUNTER — ANESTHESIA EVENT (OUTPATIENT)
Dept: OPERATING ROOM | Facility: HOSPITAL | Age: 44
End: 2024-05-09
Payer: COMMERCIAL

## 2024-05-09 ENCOUNTER — APPOINTMENT (OUTPATIENT)
Dept: RADIOLOGY | Facility: HOSPITAL | Age: 44
End: 2024-05-09
Payer: COMMERCIAL

## 2024-05-09 VITALS
HEIGHT: 74 IN | BODY MASS INDEX: 29.76 KG/M2 | SYSTOLIC BLOOD PRESSURE: 127 MMHG | WEIGHT: 231.92 LBS | DIASTOLIC BLOOD PRESSURE: 67 MMHG | HEART RATE: 69 BPM | OXYGEN SATURATION: 98 % | RESPIRATION RATE: 17 BRPM | TEMPERATURE: 97.9 F

## 2024-05-09 DIAGNOSIS — M54.16 LUMBAR RADICULOPATHY: Primary | ICD-10-CM

## 2024-05-09 DIAGNOSIS — K59.03 CONSTIPATION DUE TO OPIOID THERAPY: ICD-10-CM

## 2024-05-09 DIAGNOSIS — Z98.890 POSTOPERATIVE NAUSEA: ICD-10-CM

## 2024-05-09 DIAGNOSIS — R11.0 POSTOPERATIVE NAUSEA: ICD-10-CM

## 2024-05-09 DIAGNOSIS — G89.18 POSTOPERATIVE PAIN AFTER SPINAL SURGERY: ICD-10-CM

## 2024-05-09 DIAGNOSIS — T40.2X5A CONSTIPATION DUE TO OPIOID THERAPY: ICD-10-CM

## 2024-05-09 PROCEDURE — 3600000004 HC OR TIME - INITIAL BASE CHARGE - PROCEDURE LEVEL FOUR: Performed by: ORTHOPAEDIC SURGERY

## 2024-05-09 PROCEDURE — 7100000001 HC RECOVERY ROOM TIME - INITIAL BASE CHARGE: Performed by: ORTHOPAEDIC SURGERY

## 2024-05-09 PROCEDURE — RXMED WILLOW AMBULATORY MEDICATION CHARGE

## 2024-05-09 PROCEDURE — 2500000004 HC RX 250 GENERAL PHARMACY W/ HCPCS (ALT 636 FOR OP/ED): Performed by: NURSE ANESTHETIST, CERTIFIED REGISTERED

## 2024-05-09 PROCEDURE — 7100000002 HC RECOVERY ROOM TIME - EACH INCREMENTAL 1 MINUTE: Performed by: ORTHOPAEDIC SURGERY

## 2024-05-09 PROCEDURE — 2500000004 HC RX 250 GENERAL PHARMACY W/ HCPCS (ALT 636 FOR OP/ED): Performed by: ANESTHESIOLOGY

## 2024-05-09 PROCEDURE — A63030 PR LAMNOTMY INCL W/DCMPRSN NRV ROOT 1 INTRSPC LUMBR: Performed by: NURSE ANESTHETIST, CERTIFIED REGISTERED

## 2024-05-09 PROCEDURE — 3700000002 HC GENERAL ANESTHESIA TIME - EACH INCREMENTAL 1 MINUTE: Performed by: ORTHOPAEDIC SURGERY

## 2024-05-09 PROCEDURE — 2500000005 HC RX 250 GENERAL PHARMACY W/O HCPCS: Performed by: NURSE ANESTHETIST, CERTIFIED REGISTERED

## 2024-05-09 PROCEDURE — 2780000003 HC OR 278 NO HCPCS: Performed by: ORTHOPAEDIC SURGERY

## 2024-05-09 PROCEDURE — 2500000005 HC RX 250 GENERAL PHARMACY W/O HCPCS: Performed by: ORTHOPAEDIC SURGERY

## 2024-05-09 PROCEDURE — 7100000010 HC PHASE TWO TIME - EACH INCREMENTAL 1 MINUTE: Performed by: ORTHOPAEDIC SURGERY

## 2024-05-09 PROCEDURE — 63030 LAMOT DCMPRN NRV RT 1 LMBR: CPT | Performed by: ORTHOPAEDIC SURGERY

## 2024-05-09 PROCEDURE — C1765 ADHESION BARRIER: HCPCS | Performed by: ORTHOPAEDIC SURGERY

## 2024-05-09 PROCEDURE — 63030 LAMOT DCMPRN NRV RT 1 LMBR: CPT | Performed by: PHYSICIAN ASSISTANT

## 2024-05-09 PROCEDURE — 3700000001 HC GENERAL ANESTHESIA TIME - INITIAL BASE CHARGE: Performed by: ORTHOPAEDIC SURGERY

## 2024-05-09 PROCEDURE — 3600000009 HC OR TIME - EACH INCREMENTAL 1 MINUTE - PROCEDURE LEVEL FOUR: Performed by: ORTHOPAEDIC SURGERY

## 2024-05-09 PROCEDURE — A63030 PR LAMNOTMY INCL W/DCMPRSN NRV ROOT 1 INTRSPC LUMBR: Performed by: ANESTHESIOLOGY

## 2024-05-09 PROCEDURE — 2500000001 HC RX 250 WO HCPCS SELF ADMINISTERED DRUGS (ALT 637 FOR MEDICARE OP): Performed by: ANESTHESIOLOGY

## 2024-05-09 PROCEDURE — 7100000009 HC PHASE TWO TIME - INITIAL BASE CHARGE: Performed by: ORTHOPAEDIC SURGERY

## 2024-05-09 PROCEDURE — 2720000007 HC OR 272 NO HCPCS: Performed by: ORTHOPAEDIC SURGERY

## 2024-05-09 PROCEDURE — 76000 FLUOROSCOPY <1 HR PHYS/QHP: CPT | Mod: 59

## 2024-05-09 RX ORDER — LIDOCAINE HYDROCHLORIDE 20 MG/ML
INJECTION, SOLUTION EPIDURAL; INFILTRATION; INTRACAUDAL; PERINEURAL AS NEEDED
Status: DISCONTINUED | OUTPATIENT
Start: 2024-05-09 | End: 2024-05-09

## 2024-05-09 RX ORDER — KETOROLAC TROMETHAMINE 10 MG/1
10 TABLET, FILM COATED ORAL EVERY 6 HOURS PRN
Qty: 15 TABLET | Refills: 0 | Status: SHIPPED | OUTPATIENT
Start: 2024-05-09 | End: 2024-05-14

## 2024-05-09 RX ORDER — BUPIVACAINE HCL/EPINEPHRINE 0.5-1:200K
VIAL (ML) INJECTION AS NEEDED
Status: DISCONTINUED | OUTPATIENT
Start: 2024-05-09 | End: 2024-05-09 | Stop reason: HOSPADM

## 2024-05-09 RX ORDER — LIDOCAINE HYDROCHLORIDE 10 MG/ML
0.1 INJECTION, SOLUTION EPIDURAL; INFILTRATION; INTRACAUDAL; PERINEURAL ONCE
Status: DISCONTINUED | OUTPATIENT
Start: 2024-05-09 | End: 2024-05-09 | Stop reason: HOSPADM

## 2024-05-09 RX ORDER — OXYCODONE HYDROCHLORIDE 5 MG/1
5 TABLET ORAL EVERY 6 HOURS PRN
Qty: 28 TABLET | Refills: 0 | Status: SHIPPED | OUTPATIENT
Start: 2024-05-09 | End: 2024-05-16

## 2024-05-09 RX ORDER — METHOCARBAMOL 100 MG/ML
INJECTION, SOLUTION INTRAMUSCULAR; INTRAVENOUS AS NEEDED
Status: DISCONTINUED | OUTPATIENT
Start: 2024-05-09 | End: 2024-05-09

## 2024-05-09 RX ORDER — SODIUM CHLORIDE, SODIUM LACTATE, POTASSIUM CHLORIDE, CALCIUM CHLORIDE 600; 310; 30; 20 MG/100ML; MG/100ML; MG/100ML; MG/100ML
100 INJECTION, SOLUTION INTRAVENOUS CONTINUOUS
Status: DISCONTINUED | OUTPATIENT
Start: 2024-05-09 | End: 2024-05-09 | Stop reason: HOSPADM

## 2024-05-09 RX ORDER — FENTANYL CITRATE 50 UG/ML
INJECTION, SOLUTION INTRAMUSCULAR; INTRAVENOUS AS NEEDED
Status: DISCONTINUED | OUTPATIENT
Start: 2024-05-09 | End: 2024-05-09

## 2024-05-09 RX ORDER — ACETAMINOPHEN 325 MG/1
650 TABLET ORAL EVERY 4 HOURS PRN
Status: DISCONTINUED | OUTPATIENT
Start: 2024-05-09 | End: 2024-05-09 | Stop reason: HOSPADM

## 2024-05-09 RX ORDER — ACETAMINOPHEN 500 MG
500 TABLET ORAL EVERY 6 HOURS PRN
Qty: 40 TABLET | Refills: 0 | Status: SHIPPED | OUTPATIENT
Start: 2024-05-09 | End: 2024-05-19

## 2024-05-09 RX ORDER — CEFAZOLIN 1 G/1
INJECTION, POWDER, FOR SOLUTION INTRAVENOUS AS NEEDED
Status: DISCONTINUED | OUTPATIENT
Start: 2024-05-09 | End: 2024-05-09

## 2024-05-09 RX ORDER — ROCURONIUM BROMIDE 10 MG/ML
INJECTION, SOLUTION INTRAVENOUS AS NEEDED
Status: DISCONTINUED | OUTPATIENT
Start: 2024-05-09 | End: 2024-05-09

## 2024-05-09 RX ORDER — DOCUSATE SODIUM 100 MG/1
100 CAPSULE, LIQUID FILLED ORAL 2 TIMES DAILY
Qty: 20 CAPSULE | Refills: 0 | Status: SHIPPED | OUTPATIENT
Start: 2024-05-09 | End: 2024-05-19

## 2024-05-09 RX ORDER — KETOROLAC TROMETHAMINE 30 MG/ML
INJECTION, SOLUTION INTRAMUSCULAR; INTRAVENOUS AS NEEDED
Status: DISCONTINUED | OUTPATIENT
Start: 2024-05-09 | End: 2024-05-09

## 2024-05-09 RX ORDER — METHOCARBAMOL 500 MG/1
500 TABLET, FILM COATED ORAL 4 TIMES DAILY PRN
Qty: 40 TABLET | Refills: 0 | Status: SHIPPED | OUTPATIENT
Start: 2024-05-09 | End: 2024-05-19

## 2024-05-09 RX ORDER — PROPOFOL 10 MG/ML
INJECTION, EMULSION INTRAVENOUS AS NEEDED
Status: DISCONTINUED | OUTPATIENT
Start: 2024-05-09 | End: 2024-05-09

## 2024-05-09 RX ORDER — GENTAMICIN 40 MG/ML
INJECTION, SOLUTION INTRAMUSCULAR; INTRAVENOUS AS NEEDED
Status: DISCONTINUED | OUTPATIENT
Start: 2024-05-09 | End: 2024-05-09

## 2024-05-09 RX ORDER — ONDANSETRON 4 MG/1
4 TABLET, FILM COATED ORAL EVERY 8 HOURS PRN
Qty: 15 TABLET | Refills: 0 | Status: SHIPPED | OUTPATIENT
Start: 2024-05-09 | End: 2024-05-14

## 2024-05-09 RX ORDER — MIDAZOLAM HYDROCHLORIDE 1 MG/ML
INJECTION INTRAMUSCULAR; INTRAVENOUS AS NEEDED
Status: DISCONTINUED | OUTPATIENT
Start: 2024-05-09 | End: 2024-05-09

## 2024-05-09 RX ORDER — OXYCODONE HYDROCHLORIDE 5 MG/1
5 TABLET ORAL EVERY 4 HOURS PRN
Status: DISCONTINUED | OUTPATIENT
Start: 2024-05-09 | End: 2024-05-09 | Stop reason: HOSPADM

## 2024-05-09 RX ORDER — LIDOCAINE 560 MG/1
PATCH PERCUTANEOUS; TOPICAL; TRANSDERMAL AS NEEDED
Status: DISCONTINUED | OUTPATIENT
Start: 2024-05-09 | End: 2024-05-09 | Stop reason: HOSPADM

## 2024-05-09 RX ORDER — ONDANSETRON HYDROCHLORIDE 2 MG/ML
INJECTION, SOLUTION INTRAVENOUS AS NEEDED
Status: DISCONTINUED | OUTPATIENT
Start: 2024-05-09 | End: 2024-05-09

## 2024-05-09 RX ADMIN — PROPOFOL 200 MG: 10 INJECTION, EMULSION INTRAVENOUS at 10:06

## 2024-05-09 RX ADMIN — KETOROLAC TROMETHAMINE 30 MG: 30 INJECTION, SOLUTION INTRAMUSCULAR; INTRAVENOUS at 11:06

## 2024-05-09 RX ADMIN — ONDANSETRON 4 MG: 2 INJECTION INTRAMUSCULAR; INTRAVENOUS at 10:37

## 2024-05-09 RX ADMIN — SUGAMMADEX 200 MG: 100 INJECTION, SOLUTION INTRAVENOUS at 11:11

## 2024-05-09 RX ADMIN — LIDOCAINE HYDROCHLORIDE 100 MG: 20 INJECTION, SOLUTION EPIDURAL; INFILTRATION; INTRACAUDAL; PERINEURAL at 10:06

## 2024-05-09 RX ADMIN — DEXAMETHASONE SODIUM PHOSPHATE 10 MG: 4 INJECTION, SOLUTION INTRA-ARTICULAR; INTRALESIONAL; INTRAMUSCULAR; INTRAVENOUS; SOFT TISSUE at 11:05

## 2024-05-09 RX ADMIN — MIDAZOLAM HYDROCHLORIDE 2 MG: 1 INJECTION, SOLUTION INTRAMUSCULAR; INTRAVENOUS at 10:04

## 2024-05-09 RX ADMIN — SODIUM CHLORIDE, SODIUM LACTATE, POTASSIUM CHLORIDE, AND CALCIUM CHLORIDE: 600; 310; 30; 20 INJECTION, SOLUTION INTRAVENOUS at 09:59

## 2024-05-09 RX ADMIN — HYDROMORPHONE HYDROCHLORIDE 0.5 MG: 1 INJECTION, SOLUTION INTRAMUSCULAR; INTRAVENOUS; SUBCUTANEOUS at 11:58

## 2024-05-09 RX ADMIN — OXYCODONE HYDROCHLORIDE 5 MG: 5 TABLET ORAL at 12:14

## 2024-05-09 RX ADMIN — ROCURONIUM BROMIDE 60 MG: 10 INJECTION, SOLUTION INTRAVENOUS at 10:06

## 2024-05-09 RX ADMIN — CEFAZOLIN 2 G: 330 INJECTION, POWDER, FOR SOLUTION INTRAMUSCULAR; INTRAVENOUS at 10:01

## 2024-05-09 RX ADMIN — HYDROMORPHONE HYDROCHLORIDE 0.5 MG: 1 INJECTION, SOLUTION INTRAMUSCULAR; INTRAVENOUS; SUBCUTANEOUS at 11:35

## 2024-05-09 RX ADMIN — METHOCARBAMOL 1000 MG: 100 INJECTION INTRAMUSCULAR; INTRAVENOUS at 11:06

## 2024-05-09 RX ADMIN — GENTAMICIN SULFATE 80 MG: 40 INJECTION, SOLUTION INTRAMUSCULAR; INTRAVENOUS at 10:10

## 2024-05-09 RX ADMIN — FENTANYL CITRATE 100 MCG: 50 INJECTION, SOLUTION INTRAMUSCULAR; INTRAVENOUS at 10:22

## 2024-05-09 SDOH — HEALTH STABILITY: MENTAL HEALTH: CURRENT SMOKER: 0

## 2024-05-09 ASSESSMENT — PAIN SCALES - GENERAL
PAINLEVEL_OUTOF10: 6
PAINLEVEL_OUTOF10: 5 - MODERATE PAIN
PAINLEVEL_OUTOF10: 0 - NO PAIN
PAINLEVEL_OUTOF10: 4
PAINLEVEL_OUTOF10: 6
PAINLEVEL_OUTOF10: 6
PAINLEVEL_OUTOF10: 5 - MODERATE PAIN
PAINLEVEL_OUTOF10: 7

## 2024-05-09 ASSESSMENT — PAIN - FUNCTIONAL ASSESSMENT
PAIN_FUNCTIONAL_ASSESSMENT: 0-10
PAIN_FUNCTIONAL_ASSESSMENT: VAS (VISUAL ANALOG SCALE)
PAIN_FUNCTIONAL_ASSESSMENT: 0-10

## 2024-05-09 ASSESSMENT — COLUMBIA-SUICIDE SEVERITY RATING SCALE - C-SSRS
1. IN THE PAST MONTH, HAVE YOU WISHED YOU WERE DEAD OR WISHED YOU COULD GO TO SLEEP AND NOT WAKE UP?: NO
6. HAVE YOU EVER DONE ANYTHING, STARTED TO DO ANYTHING, OR PREPARED TO DO ANYTHING TO END YOUR LIFE?: NO
2. HAVE YOU ACTUALLY HAD ANY THOUGHTS OF KILLING YOURSELF?: NO

## 2024-05-09 ASSESSMENT — PAIN DESCRIPTION - LOCATION
LOCATION: BACK

## 2024-05-09 NOTE — POST-PROCEDURE NOTE
1230 Patient ambulated greater than 15 feet with a steady gait. Assistive devices used: None. No additional safety concerns. Notified assigned PT/OT.    1245 Nurse help pt to get dressed/ Friend at bedside    1250 IV removed    1253 Pt is waiting med to bed pharm this time    1257 Med to bed pharm at bedside

## 2024-05-09 NOTE — OP NOTE
"Left L5-S1 Lumbar Microdiscectomy (L) Operative Note     Date: 2024  OR Location: [facility] A OR    Name: Chas Castanon \"Franco\", : 1980, Age: 43 y.o., MRN: [id number], Sex: male    Diagnosis  Pre-op Diagnosis     * Lumbar radiculopathy [M54.16] Post-op Diagnosis     * Lumbar radiculopathy [M54.16]     Procedures  Left L5-S1 Lumbar Microdiscectomy  76744 - [place] LAMNOTMY INCL W/DCMPRSN NRV ROOT 1 INTRSPC LUMBR      Surgeons      * Vinicius Ruffin - Primary    Resident/Fellow/Other Assistant:  Surgeons and Role:     * Trina Killian PA-C - Assisting    Procedure Summary  Anesthesia: General  ASA: II  Anesthesia Staff: Anesthesiologist: Ray Witt MD  CRNA: MATY Haro-CRNA  Estimated Blood Loss: 5mL  Intra-op Medications:   Administrations occurring from 0900 to 1030 on 24:   Medication Name Total Dose   thrombin (recombinant) (Recothrom) topical solution 10,000 Units   gelatin absorbable (Gelfoam) 100 sponge 1 each              Anesthesia Record               Intraprocedure I/O Totals       None           Specimen: No specimens collected     Staff:   Circulator: Joshua Pinzon RN  Scrub Person: Tasha Cruz; Madhav Membreno RN         Drains and/or Catheters: * None in log *    Tourniquet Times:         Implants:         Indications: Franco Castanon is an 43 y.o. male who is having surgery for Lumbar radiculopathy [M54.16].     The patient was seen in the preoperative area. The risks, benefits, complications, treatment options, non-operative alternatives, expected recovery and outcomes were discussed with the patient. The possibilities of reaction to medication, pulmonary aspiration, injury to surrounding structures, bleeding, recurrent infection, the need for additional procedures, failure to diagnose a condition, and creating a complication requiring transfusion or operation were discussed with the patient. The patient concurred with the proposed plan, giving informed consent.  The site of " surgery was properly noted/marked if necessary per policy. The patient has been actively warmed in preoperative area. Preoperative antibiotics have been ordered and given within 1 hours of incision. Venous thrombosis prophylaxis have been ordered including bilateral sequential compression devices    Procedure Details: Patient was taken to the operating room where a formal timeout was done according to hospital protocol.  Patient was intubated without difficulty.  Patient was given preoperative antibiotics.  Patient positioned prone on the Derrick table lumbar spine was prepped and draped in usual fashion.  C-arm fluoroscopy was used to identify the left L5-S1 interlaminar space.  Small incision was made.  The appropriate retractor was placed.  Did a left-sided hemilaminotomy.  The traversing nerve root was visualized and then medialized.  We saw a large posterior lateral L5-S1 disc herniation with a pre-existing annular tear.  We slightly enlarged that tear and then pulled out numerous fragments of disc from the disc space.  We flushed within the disc space until there was no further free fragments in the S1 nerve root passed around the pedicle freely with no further ventral compression.  Floseal was placed.  There is good hemostasis.  Incision was then closed after withdrawing the retractor.  Sterile dressing was applied.    I was present and scrubbed for the entire procedure.  The physician assistant was present, scrubbed and participated in all portions of the procedure, as no qualified surgeon physician and/or resident surgeon was available to assist in the case.      Vinicius Ruffin MD    Chief of Spine Surgery, [facility]  Director of Spine Service, [facility]  , Department of Orthopaedics  [employer] [facility] [employer]  [address] [place] [address]  P: [phone]    This note was dictated with  voice recognition software.  It has not been proofread for grammatical errors, typographical mistakes or other semantic inconsistencies.    Complications:  None; patient tolerated the procedure well.    Disposition: PACU - hemodynamically stable.  Condition: stable             Attending Attestation:     Vinicius Ruffin  Phone Number: [phone]

## 2024-05-09 NOTE — ANESTHESIA POSTPROCEDURE EVALUATION
"Patient: Chas Castanon \"Franco\"    Procedure Summary       Date: 05/09/24 Room / Location: [facility] OR 07 / [facility] OR    Anesthesia Start: 0959 Anesthesia Stop: 1122    Procedure: Left L5-S1 Lumbar Microdiscectomy (Left: Spine Lumbar) Diagnosis:       Lumbar radiculopathy      (Lumbar radiculopathy [M54.16])    Surgeons: Vinicius Ruffin MD Responsible Provider: Ray Witt MD    Anesthesia Type: general ASA Status: 2            Anesthesia Type: general    Vitals Value Taken Time   /57 05/09/24 1217   Temp 36.9 °C (98.4 °F) 05/09/24 1124   Pulse 72 05/09/24 1222   Resp 18 05/09/24 1215   SpO2 99 % 05/09/24 1222   Vitals shown include unfiled device data.    Anesthesia Post Evaluation    Patient location during evaluation: PACU  Patient participation: complete - patient participated  Level of consciousness: awake  Pain management: adequate  Airway patency: patent  Cardiovascular status: acceptable  Respiratory status: acceptable  Hydration status: acceptable  Postoperative Nausea and Vomiting: none        No notable events documented.    "

## 2024-05-09 NOTE — PROGRESS NOTES
"Physical Therapy                 Therapy Screen Note    Patient Name: Chas Castanon \"Annel"  MRN: [id number]  Today's Date: 5/9/2024     Discipline: Physical Therapy    Missed Visit Reason:  (Per communication with PACU nurse, patient ambulating >15 ft using no device with good balance and safety awareness. Strength intact throughout all extremities. No skilled acute PT needs identified at this time. Will discharge PT order.)  "

## 2024-05-09 NOTE — ANESTHESIA PROCEDURE NOTES
Airway  Date/Time: 5/9/2024 10:05 AM  Urgency: elective    Airway not difficult    Staffing  Performed: CRNA   Authorized by: Ray Witt MD    Performed by: MATY Haro-TIFFANY  Patient location during procedure: OR    Indications and Patient Condition  Indications for airway management: anesthesia  Spontaneous ventilation: present  Sedation level: deep  Preoxygenated: yes  Patient position: sniffing  MILS maintained throughout  Mask difficulty assessment: 1 - vent by mask  Planned trial extubation    Final Airway Details  Final airway type: endotracheal airway      Successful airway: ETT  Cuffed: yes   Successful intubation technique: direct laryngoscopy  Facilitating devices/methods: intubating stylet  Blade: Jun  Blade size: #4  ETT size (mm): 7.5  Cormack-Lehane Classification: grade I - full view of glottis  Placement verified by: chest auscultation   Measured from: lips  Number of attempts at approach: 1  Number of other approaches attempted: 0    Additional Comments  DV of cords with cricoid pressure and head lift

## 2024-05-09 NOTE — ANESTHESIA PREPROCEDURE EVALUATION
"Patient: Chas Castanon \"Franco\"    Procedure Information       Date/Time: 05/09/24 0900    Procedure: Left L5-S1 Lumbar Microdiscectomy (Left: Spine Lumbar)    Location: [facility] A OR 07 / [facility] A OR    Surgeons: Vinicius Ruffin MD            Relevant Problems   Neuro   (+) Lumbar radiculopathy      Musculoskeletal   (+) Chronic bilateral low back pain without sciatica       Clinical information reviewed:                    Past Medical History:   Diagnosis Date    Back pain     Chronic pain disorder     Personal history of other diseases of the respiratory system 02/04/2015    History of acute bronchitis    Personal history of other specified conditions 01/13/2020    History of heartburn    Tobacco abuse counseling 12/06/2019    Encounter for smoking cessation counseling    Unspecified otitis externa, left ear 02/04/2015    Left otitis externa      Past Surgical History:   Procedure Laterality Date    LEG SURGERY Right     2006 crushed -crane at work    OTHER SURGICAL HISTORY  02/04/2015    Leg Repair     Social History     Tobacco Use    Smoking status: Former     Types: Cigarettes     Passive exposure: Current    Smokeless tobacco: Never   Vaping Use    Vaping status: Every Day    Substances: Nicotine, Flavoring   Substance Use Topics    Alcohol use: Not Currently    Drug use: Never      Current Outpatient Medications   Medication Instructions    anastrozole (Arimidex) 1 mg tablet TAKE 1 TABLET (1MG) ONCE WEEKLY AS DIRECTED BY PROVIDER    chorionic gonadotropin (Pregnyl) 10,000 unit injection INJECT 3,000 UNITS UNDER THE SKIN EVERY OTHER DAY AS DIRECTED PER PROVIDER.    clomiPHENE (Clomid) 50 mg tablet TAKE 25MG (1/2 TABLET) BY MOUTH ONCE DAILY MONDAY- FRIDAY AS DIRECTED BY PROVIDER.    ibuprofen 800 mg, oral, 3 times daily PRN    lidocaine (Lidoderm) 5 % patch 1 patch, transdermal, Daily, Apply to painful area 12 hours per day, remove for 12 hours.    methocarbamol (ROBAXIN) 500 mg, oral, Every 12 hours PRN    " "predniSONE (Deltasone) 20 mg tablet Take 3 tabs (60mg) daily for 5 days, then take 2 tabs (40mg) daily for 2 days, then take 1 tab (20mg) daily for 2 days.      No Known Allergies     Chemistry    Lab Results   Component Value Date/Time     04/19/2024 1202    K 5.0 04/19/2024 1202     04/19/2024 1202    CO2 26 04/19/2024 1202    BUN 15 04/19/2024 1202    CREATININE 1.01 04/19/2024 1202    Lab Results   Component Value Date/Time    CALCIUM 9.8 04/19/2024 1202    ALKPHOS 54 04/19/2024 1202    AST 98 (H) 04/19/2024 1202     (H) 04/19/2024 1202    BILITOT 0.8 04/19/2024 1202          No results found for: \"HGBA1C\"  Lab Results   Component Value Date/Time    WBC 3.5 (L) 04/19/2024 1202    HGB 16.7 04/19/2024 1202    HCT 48.0 04/19/2024 1202     04/19/2024 1202     Lab Results   Component Value Date/Time    PROTIME 12.2 04/19/2024 1202    INR 1.1 04/19/2024 1202     No results found for: \"ABORH\"  No results found for this or any previous visit (from the past 4464 hour(s)).  No results found for this or any previous visit from the past 1095 days.       Visit Vitals  Smoking Status Former     No data recorded     Physical Exam    Airway  Mallampati: II  TM distance: >3 FB  Neck ROM: full     Cardiovascular - normal exam     Dental - normal exam     Pulmonary - normal exam     Abdominal - normal exam              Anesthesia Plan    History of general anesthesia?: yes  History of complications of general anesthesia?: no    ASA 2     general     The patient is not a current smoker.    intravenous induction   Postoperative administration of opioids is intended.  Anesthetic plan and risks discussed with patient.  Use of blood products discussed with patient who.    Plan discussed with CAA.        "

## 2024-05-09 NOTE — DISCHARGE INSTRUCTIONS
Lumbar Decompression/Microdiscectomy/Hemilaminectomy and/or Lamincectomy (Dr. Ruffin)_    REMEMBER:  ACTIVTY:  Move regularly. Avoid staying in any one position longer than 1-2 hours, ambulate/walk frequently while awake. This will help with stiffness.  May sleep however is most comfortable (in bed or recliner)    May add in over-the-counter NSAIDs (Advil, Aleve, Motrin, ibuprofen, naproxen, diclofenac, meloxicam, Celebrex, etc) to treat pain (after taking Ketorolac if prescribed)    RESTRICTIONS:  Do not drive for 24 hours after surgery or while taking narcotics/muscle relaxants   No pushing, pulling, or lifting of objects greater than 5-10 pounds for 3 weeks  A gallon of milk is 8 pounds for reference.   No extreme bending, twisting, or turning of low back  May move as needed for activities of daily living.     BLOOD THINNERS (anticoagulants, plavix, asprin, etc):  May restart 5 days after surgery or as directed by prescribing provider.     WOUND CARE:  Do not shower for 48 hours following surgery.   Keep surgical incision clean and dry until shower. Change with non-adherent dressing daily and as needed.  If no drainage, may cover or leave uncovered based on personal preference.   Do not remove Steri-Strips they will fall off on their own.   Any nylon sutures will be removed by provider at follow up visit.   Remove lidocaine patch after 12 hours.     DIET:  Continue on clear liquid diet until passing gas.  Then may resume regular diet.   Continue stool softeners until bowel movement.   If you do not have a bowel movement in 72 hours with addition ofmagnesium citrate, go to the Emergency Department.     REMEMBER:   It is normal to have post-surgical back pain/spasms, even with small incisions.   Ice and/or heat may be helpful.    It is normal to have coming and going nerve pain in the legs as the nerve heals.   Nerve pain may be replaced initially with numbness and tingling/ pins and needles.    Week to week,  jacy louie   post-surgical pain should become less often and less intense.   Continue medication as prescribed.      CALL PHYSICIAN:   Signs of infection at incision site:   progressive drainage or an unusual odor  increased redness and or swelling  increased pain and or tenderness    Excessive Bleeding:  Slow general oozing that completely soaks dressing  Fresh bright red bleeding or bleeding that will not stop.   If drainage continues beyond 5 days of surgery.    Inability to go to the bathroom    FOR PRESCRIPTION REFILLS GIVE 48 HOURS NOTICE. Please do not wait until Friday after 3 pm to call.    Follow-up 2-3 weeks from surgery for radiographs and suture removal.    Dr. Vinicius Ruffin' Office  [facility] - : [phone]  [place] Voicemail (Derrick [facility] Trina): [phone]  *Please leave Name, , & call back number

## 2024-05-10 ASSESSMENT — PAIN SCALES - GENERAL: PAINLEVEL_OUTOF10: 0 - NO PAIN

## 2024-05-29 ENCOUNTER — TRANSCRIBE ORDERS (OUTPATIENT)
Dept: ORTHOPEDIC SURGERY | Facility: HOSPITAL | Age: 44
End: 2024-05-29
Payer: COMMERCIAL

## 2024-05-29 DIAGNOSIS — Z98.890 STATUS POST LUMBAR MICRODISCECTOMY: ICD-10-CM

## 2024-05-31 ENCOUNTER — OFFICE VISIT (OUTPATIENT)
Dept: ORTHOPEDIC SURGERY | Facility: CLINIC | Age: 44
End: 2024-05-31
Payer: COMMERCIAL

## 2024-05-31 ENCOUNTER — HOSPITAL ENCOUNTER (OUTPATIENT)
Dept: RADIOLOGY | Facility: CLINIC | Age: 44
Discharge: HOME | End: 2024-05-31
Payer: COMMERCIAL

## 2024-05-31 DIAGNOSIS — M54.16 LUMBAR RADICULOPATHY: Primary | ICD-10-CM

## 2024-05-31 DIAGNOSIS — Z98.890 STATUS POST LUMBAR MICRODISCECTOMY: ICD-10-CM

## 2024-05-31 PROCEDURE — 72100 X-RAY EXAM L-S SPINE 2/3 VWS: CPT | Performed by: RADIOLOGY

## 2024-05-31 PROCEDURE — 99024 POSTOP FOLLOW-UP VISIT: CPT | Performed by: PHYSICIAN ASSISTANT

## 2024-05-31 PROCEDURE — 72100 X-RAY EXAM L-S SPINE 2/3 VWS: CPT

## 2024-05-31 RX ORDER — IBUPROFEN 800 MG/1
800 TABLET ORAL 3 TIMES DAILY
Qty: 90 TABLET | Refills: 0 | Status: SHIPPED | OUTPATIENT
Start: 2024-05-31 | End: 2024-06-30

## 2024-05-31 ASSESSMENT — PAIN - FUNCTIONAL ASSESSMENT: PAIN_FUNCTIONAL_ASSESSMENT: NO/DENIES PAIN

## 2024-05-31 NOTE — PROGRESS NOTES
HPI:  Chas Castanon is a 43 y.o. male who presents today for initial postop visit after undergoing Left L5-S1 Lumbar Microdiscectomy  on 05/09/24 with Dr. Ruffin.    Overall, patient is doing very well. Reports the expected back discomfort and stiffness, however feels this is likely due to increased activity this week.     Denies radicular leg pain/numbness/tingling/weakness. He is ambulating well without assistive devices. No issues with the incision.     He has been taking Toradol prn for pain.     ROS:  Reviewed on EMR and patient intake sheet.    PMH/SH:  Reviewed on EMR and patient intake sheet.    Exam:  Well appearing, NAD  Pleasant & cooperative  BMI 29.78  Decreased ROM lumbar spine with rotation, flexion/extension  + paraspinal muscle spasms  lower extremity sensation intact to light touch  lower extremity motor 5/5 throughout  normal gait & station    lumbar wound healing well without signs of infection. Well approximated without redness, palpable fluid collection or drainage.   Sutures removed without complication.    Radiology:     Xrays lumbar spine done in the office today 05/31/24 personally reviewed. No fractures or signs of instability.      Assessment and Plan:  Lumbar radiculopathy    Both the patient and I are very pleased with his recovery so far. We reviewed Xrays in detail today.    We reviewed wound care in detail today. Okay to leave incision open to air. Okay to shower letting warm, soapy water run down the wound, do not scrub, pat dry. Okay to apply small amount of hydrogen peroxide nightly to scabs if needed.     Okay to lift up to 25 pounds until 6 weeks postop, then may increase lifting as tolerated. Continue to limit excessive bending/lifting/twisting at the waist, however okay to do gentle ROM exercises to help reduce stiffness & increase mobility. No strenuous activity such as running/jumping/heavy lifting/activities that strain low back until 6 weeks postop. Encourage ambulating  as tolerated. If needed okay to start outpatient physical therapy at 6-8 weeks postop.    Pain medication refilled today per patient request. OARRS reviewed. Discussed postop pain medication today and recommend transitioning from opioids to plain tylenol or nsaids.    Plan to see patient for followup with repeat Xrays 1 year postop, or sooner if needed. All questions answered. Patient in agreement to plan of care. Of course Chas Lg can call at anytime with questions or concerns.       I have personally reviewed the OARRS report for the patient, no issues identified. This report is scanned into the electronic medical record. I have considered the risks of abuse, dependence, addiction and diversion. The 7 day Rx sent to pharmacy on file.    Trina Killian PA-C  Department of Orthopaedic Surgery    [address] [place] [address]    Voicemail: [phone]   Appts: [phone]  Fax: [phone]

## 2024-06-26 ENCOUNTER — SPECIALTY PHARMACY (OUTPATIENT)
Dept: INTERNAL MEDICINE | Facility: HOSPITAL | Age: 44
End: 2024-06-26
Payer: COMMERCIAL

## 2024-06-26 DIAGNOSIS — R76.8 HEPATITIS C ANTIBODY TEST POSITIVE: Primary | ICD-10-CM

## 2024-06-26 NOTE — PROGRESS NOTES
"[facility] Clinical Note    Chas Castanon \"Annel" is a 43 y.o. male, who is on the specialty pharmacy service for management of:  Hepatitis C Core.    Chas Castanon \"Annel" is taking: Mavyret.    Medication Receipt Date: 11/15/2023  Medication Start Date (planned or actual): 6/24/24    Patient delayed starting treatment due to fertility treatment and hernia surgery.    Chas was contacted on 6/26/2024 at 12:44 PM for a virtual pharmacy visit with encounter number [id number] from:   [facility] VIRTUAL  [address] [facility]  VIRTUAL DEPARTMENT  [facility] [address]  Loc: [phone]    Chas was offered a Telemedicine Video visit or Telephone visit.  Chas consented to a telephone visit, which was performed.    The most recent encounter visit with the referring prescriber Dr. Thao on 10/5/2023 was reviewed.  Pharmacy will continue to collaborate in the care of this patient with the referring prescriber Dr. Thao.    General Assessment      Impression/Plan  IMPRESSION/PLAN:  Is patient high risk (potential patients:  pregnancy, geriatric, pediatric)?  no  Is laboratory follow-up needed? no  Is a clinical intervention needed? no  Next reassessment date? Approximately 7/28/24  Additional comments:   Patient states fertility treatment is on hold for the time being. He will follow up with their office after HCV therapy is completed in August. He was made aware that Mavyret is not recommended during conception.    Refer to the encounter summary report for documentation details about patient counseling and education.      Medication Adherence    The importance of adherence was discussed with the patient and they were advised to take the medication as prescribed by their provider. Fany was encouraged to call her physician's office if they have a question regarding a missed dose.     QOL/Patient Satisfaction  Rate your quality of life on scale of 1-10: 7  Rate " your satisfaction with  Specialty Pharmacy on scale of 1-10: 10 - Completely satisfied      Patient was advised to contact the pharmacy if there are any changes to their medication list, including prescriptions, OTC medications, herbal products, or supplements. Patient was advised of [facility]'s dispensing process, refill timeline, contact information ([phone]), and patient management follow up. Patient confirmed understanding of education conducted during assessment. All patient questions and concerns were addressed to the best of my ability. Patient was encouraged to contact the specialty pharmacy with any questions or concerns.    Confirmed follow-up outreaches are properly scheduled and reviewed goals of therapy with the patient.        Lesia Garcia, HirenD    Lab Results   Component Value Date    HCVPCRQUANT 25,800,000 (H) 10/09/2023    HCVGENO Genotype 3a 10/09/2023    AST 98 (H) 04/19/2024     (H) 04/19/2024    ALKPHOS 54 04/19/2024    HEPBCAB Nonreactive 10/09/2023      Medication start date: 6/24/24  Therapy completion:   8/19/24  Anticipated SVR date: 11/11/24

## 2024-07-24 ENCOUNTER — LAB (OUTPATIENT)
Dept: LAB | Facility: LAB | Age: 44
End: 2024-07-24
Payer: COMMERCIAL

## 2024-07-24 DIAGNOSIS — E29.1 HYPOGONADISM IN MALE: ICD-10-CM

## 2024-07-24 DIAGNOSIS — N46.9 INFERTILITY MALE: ICD-10-CM

## 2024-07-24 DIAGNOSIS — R76.8 HEPATITIS C ANTIBODY TEST POSITIVE: ICD-10-CM

## 2024-07-24 LAB
ALBUMIN SERPL BCP-MCNC: 4.9 G/DL (ref 3.4–5)
ALP SERPL-CCNC: 52 U/L (ref 33–120)
ALT SERPL W P-5'-P-CCNC: 22 U/L (ref 10–52)
AST SERPL W P-5'-P-CCNC: 19 U/L (ref 9–39)
BILIRUB DIRECT SERPL-MCNC: 0.1 MG/DL (ref 0–0.3)
BILIRUB SERPL-MCNC: 0.6 MG/DL (ref 0–1.2)
HCT VFR BLD AUTO: 47 % (ref 41–52)
PROT SERPL-MCNC: 7.5 G/DL (ref 6.4–8.2)

## 2024-07-24 PROCEDURE — 87522 HEPATITIS C REVRS TRNSCRPJ: CPT

## 2024-07-24 PROCEDURE — 84403 ASSAY OF TOTAL TESTOSTERONE: CPT

## 2024-07-24 PROCEDURE — 82670 ASSAY OF TOTAL ESTRADIOL: CPT

## 2024-07-24 PROCEDURE — 83001 ASSAY OF GONADOTROPIN (FSH): CPT

## 2024-07-24 PROCEDURE — 80076 HEPATIC FUNCTION PANEL: CPT

## 2024-07-24 PROCEDURE — 36415 COLL VENOUS BLD VENIPUNCTURE: CPT

## 2024-07-24 PROCEDURE — 85014 HEMATOCRIT: CPT

## 2024-07-24 PROCEDURE — 83002 ASSAY OF GONADOTROPIN (LH): CPT

## 2024-07-25 LAB
ESTRADIOL SERPL-MCNC: 36 PG/ML
FSH SERPL-ACNC: 10.4 IU/L
HCV RNA SERPL NAA+PROBE-ACNC: NOT DETECTED K[IU]/ML
HCV RNA SERPL NAA+PROBE-LOG IU: NORMAL {LOG_IU}/ML
LH SERPL-ACNC: 14 IU/L
TESTOST SERPL-MCNC: 682 NG/DL (ref 240–1000)

## 2024-08-02 DIAGNOSIS — Z11.3 ENCOUNTER FOR SCREENING EXAMINATION FOR SEXUALLY TRANSMITTED DISEASE: Primary | ICD-10-CM

## 2024-08-02 DIAGNOSIS — Z31.41 FERTILITY TESTING: ICD-10-CM

## 2024-09-11 ENCOUNTER — SPECIALTY PHARMACY (OUTPATIENT)
Dept: PHARMACY | Facility: CLINIC | Age: 44
End: 2024-09-11

## 2024-09-11 NOTE — PROGRESS NOTES
I spoke to this patient on 9/11/2024, to inquire if he had completed his HCV regimen. He stated that he did complete it without any side effects or missed doses. He was reminded to complete labs and to schedule afollow up with the office soon after. He verbalized understanding. He didn't have any questions for the pharmacist.

## 2024-09-19 ENCOUNTER — LAB (OUTPATIENT)
Dept: LAB | Facility: LAB | Age: 44
End: 2024-09-19
Payer: COMMERCIAL

## 2024-09-19 DIAGNOSIS — R76.8 HEPATITIS C ANTIBODY TEST POSITIVE: ICD-10-CM

## 2024-09-19 DIAGNOSIS — Z11.3 ENCOUNTER FOR SCREENING EXAMINATION FOR SEXUALLY TRANSMITTED DISEASE: ICD-10-CM

## 2024-09-19 LAB — HBV SURFACE AG SERPL QL IA: NONREACTIVE

## 2024-09-19 PROCEDURE — 87591 N.GONORRHOEAE DNA AMP PROB: CPT

## 2024-09-19 PROCEDURE — 87340 HEPATITIS B SURFACE AG IA: CPT

## 2024-09-19 PROCEDURE — 87491 CHLMYD TRACH DNA AMP PROBE: CPT

## 2024-09-19 PROCEDURE — 87522 HEPATITIS C REVRS TRNSCRPJ: CPT

## 2024-09-19 PROCEDURE — 86803 HEPATITIS C AB TEST: CPT

## 2024-09-19 PROCEDURE — 36415 COLL VENOUS BLD VENIPUNCTURE: CPT

## 2024-09-19 PROCEDURE — 87389 HIV-1 AG W/HIV-1&-2 AB AG IA: CPT

## 2024-09-19 PROCEDURE — 86780 TREPONEMA PALLIDUM: CPT

## 2024-09-20 LAB
C TRACH RRNA SPEC QL NAA+PROBE: NEGATIVE
HCV AB SER QL: REACTIVE
HCV RNA SERPL NAA+PROBE-ACNC: NOT DETECTED K[IU]/ML
HCV RNA SERPL NAA+PROBE-LOG IU: NORMAL {LOG_IU}/ML
HIV 1+2 AB+HIV1 P24 AG SERPL QL IA: NONREACTIVE
N GONORRHOEA DNA SPEC QL PROBE+SIG AMP: NEGATIVE
TREPONEMA PALLIDUM IGG+IGM AB [PRESENCE] IN SERUM OR PLASMA BY IMMUNOASSAY: NONREACTIVE

## 2024-10-03 ENCOUNTER — SPECIALTY PHARMACY (OUTPATIENT)
Dept: PHARMACY | Facility: CLINIC | Age: 44
End: 2024-10-03

## 2024-10-03 NOTE — PROGRESS NOTES
I spoke to this patient 10/3/2024,  to let him know that his HCV End of Therapy labs were not detected. He was reminded that SVR labs are due in November and to follow up with the office soon after. He verbalized understanding. He didn't have any questions for the pharmacist.

## 2025-02-07 ENCOUNTER — SPECIALTY PHARMACY (OUTPATIENT)
Dept: PHARMACY | Facility: CLINIC | Age: 45
End: 2025-02-07

## 2025-02-07 NOTE — PROGRESS NOTES
3 phone call attempts have been made to the patient, to remind them to complete HCV-SVR labs. The last attempt was made on 2/7/2025. Messages were left with my direct extension.

## 2025-07-08 NOTE — PROGRESS NOTES
"Chas Castanon \"Annel" is a 44 y.o. male who presents today for an acute visit    Dx: HYPOGONADISM, CHRONIC HCV, Hx of drug and alcohol abuse     Chief Complaint   Patient presents with    Sick Visit     Chas Castanon is coming in today as a SDS for concerns about prostate. Says he has a family history of prostate issues and he has been having frequent urination.        Pt states he has been having urinary frequency x 2 weeks and is concerned about prostate cancer as his dad was dx'd with prostate cancer in 2019 (age 79)    He reports that he has been on a 7 day cleanse, only drinking water, electrolytes and bone broth     Denies blood in urine, painful urination or testicular pain/swelling     #CHRONIC HCV   Dx'd 6/2023  Hx of alcohol and IV drug abuse, quit in 2018  Followed by GI/Hepatology, Dr. Thao   Fibroscan Oct 2023  Finished treatment in 2024--> negative RNA July 2024  Pt remains drug and alcohol free  Smokes cigars weekly     Allergies  RX Allergies[1]    Review of Systems  ROS was completed and all systems are negative with the exception of what was noted in the the HPI.       Objective     Most Recent Vitals:  Pulse 69   Ht 1.88 m (6' 2\")   Wt 88.9 kg (196 lb)   SpO2 98%   BMI 25.16 kg/m²       Current Medications  No current outpatient medications    Office Visit on 07/09/2025   Component Date Value Ref Range Status    POC Color, Urine 07/09/2025 Yellow  Straw, Yellow, Light-Yellow Final    POC Appearance, Urine 07/09/2025 Clear  Clear Final    POC Glucose, Urine 07/09/2025 NEGATIVE  NEGATIVE mg/dl Final    POC Bilirubin, Urine 07/09/2025 NEGATIVE  NEGATIVE Final    POC Ketones, Urine 07/09/2025 NEGATIVE  NEGATIVE mg/dl Final    POC Specific Gravity, Urine 07/09/2025 1.020  1.005 - 1.035 Final    POC Blood, Urine 07/09/2025 NEGATIVE  NEGATIVE Final    POC PH, Urine 07/09/2025 6.0  No Reference Range Established PH Final    POC Protein, Urine 07/09/2025 NEGATIVE  NEGATIVE mg/dl Final    POC " Urobilinogen, Urine 07/09/2025 0.2  0.2, 1.0 EU/DL Final    Poc Nitrite, Urine 07/09/2025 NEGATIVE  NEGATIVE Final    POC Leukocytes, Urine 07/09/2025 NEGATIVE  NEGATIVE Final         Physical Exam  Pulmonary:      Effort: Pulmonary effort is normal.   Skin:     Comments: Mult Tattoos    Neurological:      Mental Status: He is alert and oriented to person, place, and time.   Psychiatric:         Mood and Affect: Mood normal.         Behavior: Behavior normal.         Thought Content: Thought content normal.       Assessment & Plan  Urinary frequency  IO UA---> negative leukocytes, blood, nitrites     Orders:    POCT UA Automated manually resulted    Chronic hepatitis C without hepatic coma (Multi)  Condition stable based on symptoms and exam.    Continue current medications and follow-up at least yearly.  Negative RNA in 7/2024       Screening for prostate cancer    Orders:    PSA, Total and Free; Future    Lipid screening    Orders:    Lipid Panel; Future    Screening for multiple conditions    Orders:    Comprehensive Metabolic Panel; Future    CBC; Future         I spent a total of 25 minutes on the date of the service which included preparing to see the patient, face-to-face patient care, completing clinical documentation, obtaining and/or reviewing separately obtained history, performing a medically appropriate examination, counseling and educating the patient/family/caregiver, ordering medications and/or tests, communicating with other HCPs (not separately reported), communicating results to the patient/family/caregiver and care coordination (not separately reported).     Assessment, impression and plan is reflected in the note above as well as the orders.     Plan was discussed with the patient and patient signalled understanding of the plan.              [1] No Known Allergies

## 2025-07-09 ENCOUNTER — OFFICE VISIT (OUTPATIENT)
Dept: PRIMARY CARE | Facility: CLINIC | Age: 45
End: 2025-07-09
Payer: COMMERCIAL

## 2025-07-09 VITALS — HEIGHT: 74 IN | HEART RATE: 69 BPM | OXYGEN SATURATION: 98 % | WEIGHT: 196 LBS | BODY MASS INDEX: 25.15 KG/M2

## 2025-07-09 DIAGNOSIS — Z12.5 SCREENING FOR PROSTATE CANCER: ICD-10-CM

## 2025-07-09 DIAGNOSIS — Z13.89 SCREENING FOR MULTIPLE CONDITIONS: ICD-10-CM

## 2025-07-09 DIAGNOSIS — B18.2 CHRONIC HEPATITIS C WITHOUT HEPATIC COMA (MULTI): ICD-10-CM

## 2025-07-09 DIAGNOSIS — R35.0 URINARY FREQUENCY: Primary | ICD-10-CM

## 2025-07-09 DIAGNOSIS — Z13.220 LIPID SCREENING: ICD-10-CM

## 2025-07-09 PROBLEM — R10.9 FLANK PAIN: Status: RESOLVED | Noted: 2023-08-29 | Resolved: 2025-07-09

## 2025-07-09 PROBLEM — F11.23 OPIOID DEPENDENCE WITH WITHDRAWAL (MULTI): Status: RESOLVED | Noted: 2023-06-27 | Resolved: 2025-07-09

## 2025-07-09 PROBLEM — T50.901A ACCIDENTAL DRUG OVERDOSE: Status: RESOLVED | Noted: 2023-08-29 | Resolved: 2025-07-09

## 2025-07-09 LAB
POC APPEARANCE, URINE: CLEAR
POC BILIRUBIN, URINE: NEGATIVE
POC BLOOD, URINE: NEGATIVE
POC COLOR, URINE: YELLOW
POC GLUCOSE, URINE: NEGATIVE MG/DL
POC KETONES, URINE: NEGATIVE MG/DL
POC LEUKOCYTES, URINE: NEGATIVE
POC NITRITE,URINE: NEGATIVE
POC PH, URINE: 6 PH
POC PROTEIN, URINE: NEGATIVE MG/DL
POC SPECIFIC GRAVITY, URINE: 1.02
POC UROBILINOGEN, URINE: 0.2 EU/DL

## 2025-07-09 PROCEDURE — 3008F BODY MASS INDEX DOCD: CPT | Performed by: NURSE PRACTITIONER

## 2025-07-09 PROCEDURE — 99213 OFFICE O/P EST LOW 20 MIN: CPT | Performed by: NURSE PRACTITIONER

## 2025-07-09 PROCEDURE — 81003 URINALYSIS AUTO W/O SCOPE: CPT | Performed by: NURSE PRACTITIONER

## 2025-07-09 PROCEDURE — 1036F TOBACCO NON-USER: CPT | Performed by: NURSE PRACTITIONER

## 2025-07-09 ASSESSMENT — PATIENT HEALTH QUESTIONNAIRE - PHQ9
2. FEELING DOWN, DEPRESSED OR HOPELESS: NOT AT ALL
SUM OF ALL RESPONSES TO PHQ9 QUESTIONS 1 AND 2: 0
1. LITTLE INTEREST OR PLEASURE IN DOING THINGS: NOT AT ALL

## 2025-07-09 NOTE — ASSESSMENT & PLAN NOTE
Condition stable based on symptoms and exam.    Continue current medications and follow-up at least yearly.  Negative RNA in 7/2024

## 2025-07-09 NOTE — PATIENT INSTRUCTIONS
Thank you for seeing me today Chas Castanon, it was a pleasure to see you again!    Your urine was clear    Lab work ordered today.  Please have your blood drawn in the next 1-2 weeks.  You need to be FASTING for 12 hours prior to blood draw.  You may only have water.  Please contact your insurance company to ask about the best location to get blood drawn.  We will contact you with the results of your blood work and any necessary adjustments  to your plan of care, if you do not hear from us within 3-5 days of having your blood drawn, please call the office at [phone].      For assistance with scheduling referrals or consultations, please call [phone] or [phone].    For laboratory, radiology, and other tests, please call [phone] ([phone] for pediatrics).   For laboratory locations, please visit [email or web address]   If you do not get results within 7-10 days, or you have any questions or concerns, please send a message, call the office ([phone]), or return to the office for a follow-up appointment.     For acute/sick visits, if you are unable to get an office visit, you can do a  On Demand Virtual Visit that is accessible via your My Chart account.  For emergencies, call 9-1-1 or go to the nearest Emergency Department.     Please schedule additional appointment(s) to address concern(s) not addressed today.    Please review prescription inserts and published information for possible adverse effects of all medications.     In general, results are discussed over the phone or via  [facility].     You can see your health information, review clinical summaries from office visits & test results online when you follow your health with MY  Chart, a personal health record.   To sign up go to [email or web address].   If you need assistance with signing up or trouble getting into your account call [facility] Patient Line 24/7 at [phone]     [facility] FOR  CPE     ~Elizabeth Marie, NP

## 2025-07-10 LAB
ALBUMIN SERPL-MCNC: 4.7 G/DL (ref 3.6–5.1)
ALP SERPL-CCNC: 43 U/L (ref 36–130)
ALT SERPL-CCNC: 14 U/L (ref 9–46)
ANION GAP SERPL CALCULATED.4IONS-SCNC: 9 MMOL/L (CALC) (ref 7–17)
AST SERPL-CCNC: 16 U/L (ref 10–40)
BILIRUB SERPL-MCNC: 0.5 MG/DL (ref 0.2–1.2)
BUN SERPL-MCNC: 17 MG/DL (ref 7–25)
CALCIUM SERPL-MCNC: 9.7 MG/DL (ref 8.6–10.3)
CHLORIDE SERPL-SCNC: 107 MMOL/L (ref 98–110)
CHOLEST SERPL-MCNC: 223 MG/DL
CHOLEST/HDLC SERPL: 4.1 (CALC)
CO2 SERPL-SCNC: 27 MMOL/L (ref 20–32)
CREAT SERPL-MCNC: 0.93 MG/DL (ref 0.6–1.29)
EGFRCR SERPLBLD CKD-EPI 2021: 104 ML/MIN/1.73M2
ERYTHROCYTE [DISTWIDTH] IN BLOOD BY AUTOMATED COUNT: 12 % (ref 11–15)
GLUCOSE SERPL-MCNC: 99 MG/DL (ref 65–99)
HCT VFR BLD AUTO: 46.5 % (ref 38.5–50)
HDLC SERPL-MCNC: 55 MG/DL
HGB BLD-MCNC: 15.5 G/DL (ref 13.2–17.1)
LDLC SERPL CALC-MCNC: 152 MG/DL (CALC)
MCH RBC QN AUTO: 31.4 PG (ref 27–33)
MCHC RBC AUTO-ENTMCNC: 33.3 G/DL (ref 32–36)
MCV RBC AUTO: 94.3 FL (ref 80–100)
NONHDLC SERPL-MCNC: 168 MG/DL (CALC)
PLATELET # BLD AUTO: 214 THOUSAND/UL (ref 140–400)
PMV BLD REES-ECKER: 12 FL (ref 7.5–12.5)
POTASSIUM SERPL-SCNC: 4.8 MMOL/L (ref 3.5–5.3)
PROT SERPL-MCNC: 6.8 G/DL (ref 6.1–8.1)
PSA FREE MFR SERPL: 33 % (CALC)
PSA FREE SERPL-MCNC: 0.2 NG/ML
PSA SERPL-MCNC: 0.6 NG/ML
RBC # BLD AUTO: 4.93 MILLION/UL (ref 4.2–5.8)
SODIUM SERPL-SCNC: 143 MMOL/L (ref 135–146)
TRIGL SERPL-MCNC: 61 MG/DL
WBC # BLD AUTO: 3.9 THOUSAND/UL (ref 3.8–10.8)

## 2025-07-10 NOTE — RESULT ENCOUNTER NOTE
"Cahs Castanon \"Franco\"    I have reviewed all of your blood work and it looks fine.     Your kidney and liver function were normal.    Your LDLs (bad cholesterol) were higher than normal.    Please try to exercise regularly: at least 150 minutes/week  Cut back on foods high in trans fat and saturated fats, like red meats, creams, cheese, and butter  Limit sweets and salt   Our goal is to have your LDL's < 70    If you have any questions, please feel free to call my office.    Take Care,   Elizabeth     "

## 2025-07-28 ENCOUNTER — TELEPHONE (OUTPATIENT)
Dept: PRIMARY CARE | Facility: CLINIC | Age: 45
End: 2025-07-28
Payer: COMMERCIAL

## 2025-07-28 NOTE — TELEPHONE ENCOUNTER
Patient called wanting to discuss lab results with you that he had done earlier this month. I am going to set up a virtual.

## 2025-07-31 NOTE — PROGRESS NOTES
"Subjective   Chief Complaint   Patient presents with    Results       Patient ID: Chas Castanon \"Annel" is a 45 y.o. male who presents for Results.    HPI  Chas \"Annel" is a 44 yo est male presenting virtually per his request to discuss lab results    I performed this visit using real-time telehealth tools, including an audio/video connection between Chas Castanon  and myself, Moni Marie CNP,  within the state [facility].  Consent has been obtained for this visit.  I have verbally confirmed with Chas Castanon (or parent if under 18) that they are physically located in the [facility] during this virtual visit.  Telemedicine appropriate evaluation completed.  Unable to perform complete physical exam due to virtual visit.    Pt states he was waiting to hear about his blood test results. I informed him that I sent him a results message on 7/10/25 but apparently he did not see it.   I reviewed the results with him over the phone and he did not have any questions     Pt will f/u as needed       No Known Allergies    Review of Systems  ROS was completed and all systems are negative with the exception of what was noted in the the HPI.       Objective     Last Recorded Vitals   There were no vitals taken for this visit.     Medications  No current outpatient medications    Surgical History[1]    No visits with results within 2 Week(s) from this visit.   Latest known visit with results is:   Office Visit on 07/09/2025   Component Date Value Ref Range Status    POC Color, Urine 07/09/2025 Yellow  Straw, Yellow, Light-Yellow Final    POC Appearance, Urine 07/09/2025 Clear  Clear Final    POC Glucose, Urine 07/09/2025 NEGATIVE  NEGATIVE mg/dl Final    POC Bilirubin, Urine 07/09/2025 NEGATIVE  NEGATIVE Final    POC Ketones, Urine 07/09/2025 NEGATIVE  NEGATIVE mg/dl Final    POC Specific Gravity, Urine 07/09/2025 1.020  1.005 - 1.035 Final    POC Blood, Urine 07/09/2025 NEGATIVE  NEGATIVE Final    POC PH, Urine 07/09/2025 " 6.0  No Reference Range Established PH Final    POC Protein, Urine 07/09/2025 NEGATIVE  NEGATIVE mg/dl Final    POC Urobilinogen, Urine 07/09/2025 0.2  0.2, 1.0 EU/DL Final    Poc Nitrite, Urine 07/09/2025 NEGATIVE  NEGATIVE Final    POC Leukocytes, Urine 07/09/2025 NEGATIVE  NEGATIVE Final    PSA, TOTAL 07/09/2025 0.6  < OR = 4.0 ng/mL Final    PSA, FREE 07/09/2025 0.2  ng/mL Final    PSA, % FREE 07/09/2025 33  >25 % (calc) Final    Comment:    PSA(ng/mL)      Free PSA(%)     Estimated(x) Probability                                       of Cancer(as%)  0-2.5              (*)               Approx. 1  2.6-4.0(1)         0-27(2)                   24(3)  4.1-10(4)          0-10                      56                     11-15                     28                     16-20                     20                     21-25                     16                     >or =26                   8  >10(+)             N/A                      >50     References:(1)Bren et al.:Urology 60: 469-474 (2002)             (2)Bren et al.:J.Urol 168: 922-925 (2002)                Free PSA(%)   Sensitivity(%)  Specificity(%)                < or = 25          85              19                < or = 30          93               9             (3)Catalona et al.:: [id number] (1997)             (4)Catalona et al.:: [phone] (1998)     (x)These estimates vary with age, ethnicity, family      history and KRISTEN results.  (*)The                            diagnostic usefulness of % Free PSA has not been     established in patients with total PSA below 2.6 ng/mL  (+)In men with PSA above 10 ng/mL, prostate cancer risk is     determined by total PSA alone.     The Total PSA value from this assay system is   standardized against the equimolar PSA standard.   The test result will be approximately 20% higher   when compared to the WHO-standardized Total PSA   (Siemens assay). Comparison of serial PSA results   should be  interpreted with this fact in mind.     PSA was performed using the [employer] [place]  Immunoassay method. Values obtained from different  assay methods cannot be used interchangeably. PSA  levels, regardless of value, should not be interpreted  as absolute evidence of the presence or absence of  disease.         CHOLESTEROL, TOTAL 07/09/2025 223 (H)  <200 mg/dL Final    HDL CHOLESTEROL 07/09/2025 55  > OR = 40 mg/dL Final    TRIGLYCERIDES 07/09/2025 61  <150 mg/dL Final    LDL-CHOLESTEROL 07/09/2025 152 (H)  mg/dL (calc) Final    Comment: Reference range: <100     Desirable range <100 mg/dL for primary prevention;    <70 mg/dL for patients with CHD or diabetic patients   with > or = 2 CHD risk factors.     LDL-C is now calculated using the Damon-Cooney   calculation, which is a validated novel method providing   better accuracy than the Friedewald equation in the   estimation of LDL-C.   Damon WHALEY et al. TIN. 2013;310(19): 5649-3648   (http://education.S[employer].Ali/faq/SIW173)      CHOL/HDLC RATIO 07/09/2025 4.1  <5.0 (calc) Final    NON HDL CHOLESTEROL 07/09/2025 168 (H)  <130 mg/dL (calc) Final    Comment: For patients with diabetes plus 1 major ASCVD risk   factor, treating to a non-HDL-C goal of <100 mg/dL   (LDL-C of <70 mg/dL) is considered a therapeutic   option.      GLUCOSE 07/09/2025 99  65 - 99 mg/dL Final    Comment:               Fasting reference interval         UREA NITROGEN (BUN) 07/09/2025 17  7 - 25 mg/dL Final    CREATININE 07/09/2025 0.93  0.60 - 1.29 mg/dL Final    EGFR 07/09/2025 104  > OR = 60 mL/min/1.73m2 Final    SODIUM 07/09/2025 143  135 - 146 mmol/L Final    POTASSIUM 07/09/2025 4.8  3.5 - 5.3 mmol/L Final    CHLORIDE 07/09/2025 107  98 - 110 mmol/L Final    CARBON DIOXIDE 07/09/2025 27  20 - 32 mmol/L Final    ELECTROLYTE BALANCE 07/09/2025 9  7 - 17 mmol/L (calc) Final    CALCIUM 07/09/2025 9.7  8.6 - 10.3 mg/dL Final    PROTEIN, TOTAL 07/09/2025 6.8  6.1 - 8.1 g/dL Final     ALBUMIN 07/09/2025 4.7  3.6 - 5.1 g/dL Final    BILIRUBIN, TOTAL 07/09/2025 0.5  0.2 - 1.2 mg/dL Final    ALKALINE PHOSPHATASE 07/09/2025 43  36 - 130 U/L Final    AST 07/09/2025 16  10 - 40 U/L Final    ALT 07/09/2025 14  9 - 46 U/L Final    WHITE BLOOD CELL COUNT 07/09/2025 3.9  3.8 - 10.8 Thousand/uL Final    RED BLOOD CELL COUNT 07/09/2025 4.93  4.20 - 5.80 Million/uL Final    HEMOGLOBIN 07/09/2025 15.5  13.2 - 17.1 g/dL Final    HEMATOCRIT 07/09/2025 46.5  38.5 - 50.0 % Final    MCV 07/09/2025 94.3  80.0 - 100.0 fL Final    MCH 07/09/2025 31.4  27.0 - 33.0 pg Final    MCHC 07/09/2025 33.3  32.0 - 36.0 g/dL Final    Comment: For adults, a slight decrease in the calculated MCHC  value (in the range of 30 to 32 g/dL) is most likely  not clinically significant; however, it should be  interpreted with caution in correlation with other  red cell parameters and the patient's clinical  condition.      RDW 07/09/2025 12.0  11.0 - 15.0 % Final    PLATELET COUNT 07/09/2025 214  140 - 400 Thousand/uL Final    MPV 07/09/2025 12.0  7.5 - 12.5 fL Final       Physical Exam  Pulmonary:      Effort: Pulmonary effort is normal.     Neurological:      Mental Status: He is alert and oriented to person, place, and time.     Psychiatric:         Mood and Affect: Mood normal.         Behavior: Behavior normal.         Thought Content: Thought content normal.       Assessment & Plan  Encounter to discuss test results  Reviewed all results with pt via VV                 [1]   Past Surgical History:  Procedure Laterality Date    BACK SURGERY  05/2024    Microdisectomy    LEG SURGERY Right     2006 crushed -crane at work    OTHER SURGICAL HISTORY  02/04/2015    Leg Repair

## 2025-08-01 ENCOUNTER — TELEMEDICINE (OUTPATIENT)
Dept: PRIMARY CARE | Facility: CLINIC | Age: 45
End: 2025-08-01
Payer: COMMERCIAL

## 2025-08-01 DIAGNOSIS — Z71.2 ENCOUNTER TO DISCUSS TEST RESULTS: Primary | ICD-10-CM

## 2025-08-01 PROCEDURE — 99212 OFFICE O/P EST SF 10 MIN: CPT | Performed by: NURSE PRACTITIONER

## (undated) DEVICE — CARTRIDGE, MAESTRO OIL, CORE

## (undated) DEVICE — SOLUTION, IRRIGATION, SODIUM CHLORIDE 0.9%, 1000 ML, POUR BOTTLE

## (undated) DEVICE — KIT, PATIENT CARE, JACKSON TABLE W/PRONE-SAFE HEADREST

## (undated) DEVICE — DRAPE, MICROSCOPE, FOR ZEISS 65MM, VARI-LENS II, 52 X 150

## (undated) DEVICE — KIT, MINOR, DOUBLE BASIN

## (undated) DEVICE — ELECTRODE, ELECTROSURGICAL, BLADE, INSULATED, ENT/IMA, STERILE

## (undated) DEVICE — SUTURE, PDS II, 0, 27 IN, CT1, VIOLET

## (undated) DEVICE — TIP,  ELECTRODE COATED INSULATED, EXTENDED, LF

## (undated) DEVICE — ADHESIVE, SKIN, MASTISOL, 2/3 CC VIAL

## (undated) DEVICE — GLOVE, SURGICAL, PROTEXIS PI ORTHO, 8.0, PF, LF

## (undated) DEVICE — DIFFUSER, MAESTRO, CORE

## (undated) DEVICE — DRESSING, ADHESIVE, ISLAND, TELFA, 2 X 3.75 IN, LF

## (undated) DEVICE — DRAPE, C-ARM, W/12 IN COVER, LI XTRAY TUBE

## (undated) DEVICE — STRIP, SKIN CLOSURE, STERI STRIP, REINFORCED, 0.5 X 4 IN

## (undated) DEVICE — ADHESIVE, SKIN, LIQUIBAND EXCEED

## (undated) DEVICE — DRAPE, INCISE, ANTIMICROBIAL, IOBAN 2, STERI DRAPE, 23 X 33 IN, DISPOSABLE, STERILE

## (undated) DEVICE — SUTURE, PDS II, 2-0, 27 IN, SH, VIOLET

## (undated) DEVICE — GLOVE, SURGICAL, PROTEXIS PI MICRO, 7.5, PF, LF

## (undated) DEVICE — BUR, 3MM PRECISION MATCH HEAD, 16CM

## (undated) DEVICE — FLOSEAL, MATRIX, HEMOSTATIC, FULL STERILE PREP, 5ML

## (undated) DEVICE — STAPLER, SKIN PROXIMATE, 35 WIDE

## (undated) DEVICE — APPLICATOR, CHLORAPREP, W/ORANGE TINT, 26ML

## (undated) DEVICE — Device

## (undated) DEVICE — TOWEL, SURGICAL, NEURO, O/R, 16 X 26, BLUE, STERILE

## (undated) DEVICE — SUTURE, ETHILON, 2-0, FSLX 30, BLACK

## (undated) DEVICE — PREP TRAY, VAGINAL